# Patient Record
Sex: MALE | Race: WHITE | NOT HISPANIC OR LATINO | Employment: OTHER | URBAN - METROPOLITAN AREA
[De-identification: names, ages, dates, MRNs, and addresses within clinical notes are randomized per-mention and may not be internally consistent; named-entity substitution may affect disease eponyms.]

---

## 2021-05-25 ENCOUNTER — HOSPITAL ENCOUNTER (INPATIENT)
Facility: HOSPITAL | Age: 61
LOS: 7 days | Discharge: HOME HEALTH | End: 2021-06-01
Attending: INTERNAL MEDICINE | Admitting: INTERNAL MEDICINE

## 2021-05-25 ENCOUNTER — APPOINTMENT (OUTPATIENT)
Dept: GENERAL RADIOLOGY | Facility: HOSPITAL | Age: 61
End: 2021-05-25

## 2021-05-25 DIAGNOSIS — M06.9 RHEUMATOID ARTHRITIS OF OTHER SITE, UNSPECIFIED WHETHER RHEUMATOID FACTOR PRESENT (HCC): ICD-10-CM

## 2021-05-25 DIAGNOSIS — L89.220 PRESSURE INJURY OF LEFT THIGH, UNSTAGEABLE (HCC): Primary | ICD-10-CM

## 2021-05-25 PROBLEM — N17.9 ACUTE RENAL FAILURE (ARF) (HCC): Status: ACTIVE | Noted: 2021-05-25

## 2021-05-25 PROBLEM — D50.9 IRON DEFICIENCY ANEMIA: Status: ACTIVE | Noted: 2019-04-09

## 2021-05-25 PROBLEM — M05.00 FELTY'S SYNDROME (HCC): Status: ACTIVE | Noted: 2021-05-25

## 2021-05-25 PROBLEM — I48.91 ATRIAL FIBRILLATION (HCC): Status: ACTIVE | Noted: 2018-11-07

## 2021-05-25 PROBLEM — D69.6 THROMBOCYTOPENIC DISORDER (HCC): Status: ACTIVE | Noted: 2021-05-25

## 2021-05-25 PROBLEM — R60.0 BILATERAL LOWER EXTREMITY EDEMA: Chronic | Status: ACTIVE | Noted: 2021-05-25

## 2021-05-25 PROBLEM — F10.20 ALCOHOLISM (HCC): Status: ACTIVE | Noted: 2021-05-25

## 2021-05-25 PROBLEM — F17.200 NICOTINE DEPENDENCE: Status: ACTIVE | Noted: 2021-05-25

## 2021-05-25 PROBLEM — E53.8 DISORDER OF VITAMIN B12: Status: ACTIVE | Noted: 2019-08-21

## 2021-05-25 PROBLEM — D70.9 NEUTROPENIA (HCC): Status: ACTIVE | Noted: 2021-05-25

## 2021-05-25 PROBLEM — M87.9 BONE NECROSIS (HCC): Status: ACTIVE | Noted: 2019-08-21

## 2021-05-25 PROBLEM — R73.9 HYPERGLYCEMIA: Status: ACTIVE | Noted: 2021-05-25

## 2021-05-25 PROBLEM — N17.9 AKI (ACUTE KIDNEY INJURY) (HCC): Status: ACTIVE | Noted: 2021-05-25

## 2021-05-25 PROBLEM — R60.0 EDEMA OF LOWER EXTREMITY: Status: ACTIVE | Noted: 2021-05-25

## 2021-05-25 PROBLEM — J18.9 PNEUMONIA: Status: ACTIVE | Noted: 2021-05-25

## 2021-05-25 PROBLEM — G62.9 PERIPHERAL NERVE DISEASE: Status: ACTIVE | Noted: 2019-08-21

## 2021-05-25 LAB
ALBUMIN SERPL-MCNC: 2.4 G/DL (ref 3.5–5.2)
ALBUMIN/GLOB SERPL: 0.6 G/DL
ALP SERPL-CCNC: 67 U/L (ref 39–117)
ALT SERPL W P-5'-P-CCNC: 12 U/L (ref 1–41)
AMMONIA BLD-SCNC: <10 UMOL/L (ref 16–60)
ANION GAP SERPL CALCULATED.3IONS-SCNC: 14 MMOL/L (ref 5–15)
AST SERPL-CCNC: 20 U/L (ref 1–40)
B PARAPERT DNA SPEC QL NAA+PROBE: NOT DETECTED
B PERT DNA SPEC QL NAA+PROBE: NOT DETECTED
BILIRUB SERPL-MCNC: 0.8 MG/DL (ref 0–1.2)
BUN SERPL-MCNC: 29 MG/DL (ref 8–23)
BUN/CREAT SERPL: 23 (ref 7–25)
C PNEUM DNA NPH QL NAA+NON-PROBE: NOT DETECTED
CALCIUM SPEC-SCNC: 8.2 MG/DL (ref 8.6–10.5)
CHLORIDE SERPL-SCNC: 105 MMOL/L (ref 98–107)
CO2 SERPL-SCNC: 16 MMOL/L (ref 22–29)
CREAT SERPL-MCNC: 1.26 MG/DL (ref 0.76–1.27)
D-LACTATE SERPL-SCNC: 1.5 MMOL/L (ref 0.5–2)
FLUAV SUBTYP SPEC NAA+PROBE: NOT DETECTED
FLUBV RNA ISLT QL NAA+PROBE: NOT DETECTED
GFR SERPL CREATININE-BSD FRML MDRD: 58 ML/MIN/1.73
GLOBULIN UR ELPH-MCNC: 4.3 GM/DL
GLUCOSE SERPL-MCNC: 81 MG/DL (ref 65–99)
HADV DNA SPEC NAA+PROBE: NOT DETECTED
HCOV 229E RNA SPEC QL NAA+PROBE: NOT DETECTED
HCOV HKU1 RNA SPEC QL NAA+PROBE: NOT DETECTED
HCOV NL63 RNA SPEC QL NAA+PROBE: NOT DETECTED
HCOV OC43 RNA SPEC QL NAA+PROBE: NOT DETECTED
HMPV RNA NPH QL NAA+NON-PROBE: NOT DETECTED
HPIV1 RNA SPEC QL NAA+PROBE: NOT DETECTED
HPIV2 RNA SPEC QL NAA+PROBE: NOT DETECTED
HPIV3 RNA NPH QL NAA+PROBE: NOT DETECTED
HPIV4 P GENE NPH QL NAA+PROBE: NOT DETECTED
L PNEUMO1 AG UR QL IA: NEGATIVE
M PNEUMO IGG SER IA-ACNC: NOT DETECTED
MAGNESIUM SERPL-MCNC: 2 MG/DL (ref 1.6–2.4)
NT-PROBNP SERPL-MCNC: 7361 PG/ML (ref 0–900)
POTASSIUM SERPL-SCNC: 4.1 MMOL/L (ref 3.5–5.2)
PROT SERPL-MCNC: 6.7 G/DL (ref 6–8.5)
RHINOVIRUS RNA SPEC NAA+PROBE: NOT DETECTED
RSV RNA NPH QL NAA+NON-PROBE: NOT DETECTED
S PNEUM AG SPEC QL LA: NEGATIVE
SARS-COV-2 RNA NPH QL NAA+NON-PROBE: NOT DETECTED
SODIUM SERPL-SCNC: 135 MMOL/L (ref 136–145)

## 2021-05-25 PROCEDURE — 71045 X-RAY EXAM CHEST 1 VIEW: CPT

## 2021-05-25 PROCEDURE — 83880 ASSAY OF NATRIURETIC PEPTIDE: CPT | Performed by: NURSE PRACTITIONER

## 2021-05-25 PROCEDURE — 25010000002 CEFTRIAXONE PER 250 MG: Performed by: NURSE PRACTITIONER

## 2021-05-25 PROCEDURE — 80053 COMPREHEN METABOLIC PANEL: CPT | Performed by: NURSE PRACTITIONER

## 2021-05-25 PROCEDURE — 99222 1ST HOSP IP/OBS MODERATE 55: CPT | Performed by: NURSE PRACTITIONER

## 2021-05-25 PROCEDURE — 94799 UNLISTED PULMONARY SVC/PX: CPT

## 2021-05-25 PROCEDURE — 82140 ASSAY OF AMMONIA: CPT | Performed by: NURSE PRACTITIONER

## 2021-05-25 PROCEDURE — 83735 ASSAY OF MAGNESIUM: CPT | Performed by: NURSE PRACTITIONER

## 2021-05-25 PROCEDURE — 87899 AGENT NOS ASSAY W/OPTIC: CPT | Performed by: NURSE PRACTITIONER

## 2021-05-25 PROCEDURE — 94640 AIRWAY INHALATION TREATMENT: CPT

## 2021-05-25 PROCEDURE — 25010000002 METHYLPREDNISOLONE PER 40 MG: Performed by: NURSE PRACTITIONER

## 2021-05-25 PROCEDURE — 93005 ELECTROCARDIOGRAM TRACING: CPT | Performed by: NURSE PRACTITIONER

## 2021-05-25 PROCEDURE — 87040 BLOOD CULTURE FOR BACTERIA: CPT | Performed by: NURSE PRACTITIONER

## 2021-05-25 PROCEDURE — 81001 URINALYSIS AUTO W/SCOPE: CPT | Performed by: NURSE PRACTITIONER

## 2021-05-25 PROCEDURE — 0202U NFCT DS 22 TRGT SARS-COV-2: CPT | Performed by: INTERNAL MEDICINE

## 2021-05-25 PROCEDURE — 93010 ELECTROCARDIOGRAM REPORT: CPT | Performed by: INTERNAL MEDICINE

## 2021-05-25 PROCEDURE — 84300 ASSAY OF URINE SODIUM: CPT | Performed by: NURSE PRACTITIONER

## 2021-05-25 PROCEDURE — 83605 ASSAY OF LACTIC ACID: CPT | Performed by: NURSE PRACTITIONER

## 2021-05-25 RX ORDER — BUMETANIDE 0.25 MG/ML
1 INJECTION INTRAMUSCULAR; INTRAVENOUS ONCE
Status: COMPLETED | OUTPATIENT
Start: 2021-05-25 | End: 2021-05-25

## 2021-05-25 RX ORDER — HYDROXYCHLOROQUINE SULFATE 200 MG/1
200 TABLET, FILM COATED ORAL 2 TIMES DAILY
COMMUNITY
End: 2021-06-01 | Stop reason: HOSPADM

## 2021-05-25 RX ORDER — SODIUM CHLORIDE 0.9 % (FLUSH) 0.9 %
10 SYRINGE (ML) INJECTION AS NEEDED
Status: DISCONTINUED | OUTPATIENT
Start: 2021-05-25 | End: 2021-06-01 | Stop reason: HOSPADM

## 2021-05-25 RX ORDER — ACETAMINOPHEN 325 MG/1
650 TABLET ORAL EVERY 4 HOURS PRN
Status: DISCONTINUED | OUTPATIENT
Start: 2021-05-25 | End: 2021-06-01 | Stop reason: HOSPADM

## 2021-05-25 RX ORDER — HYDROCODONE BITARTRATE AND ACETAMINOPHEN 7.5; 325 MG/1; MG/1
1 TABLET ORAL EVERY 4 HOURS PRN
COMMUNITY

## 2021-05-25 RX ORDER — LORAZEPAM 1 MG/1
2 TABLET ORAL
Status: DISCONTINUED | OUTPATIENT
Start: 2021-05-25 | End: 2021-06-01 | Stop reason: HOSPADM

## 2021-05-25 RX ORDER — METHYLPREDNISOLONE SODIUM SUCCINATE 40 MG/ML
40 INJECTION, POWDER, LYOPHILIZED, FOR SOLUTION INTRAMUSCULAR; INTRAVENOUS EVERY 8 HOURS
Status: DISCONTINUED | OUTPATIENT
Start: 2021-05-25 | End: 2021-05-26

## 2021-05-25 RX ORDER — LORAZEPAM 2 MG/ML
1 INJECTION INTRAMUSCULAR
Status: DISCONTINUED | OUTPATIENT
Start: 2021-05-25 | End: 2021-06-01 | Stop reason: HOSPADM

## 2021-05-25 RX ORDER — FOLIC ACID 1 MG/1
500 TABLET ORAL DAILY
Status: DISCONTINUED | OUTPATIENT
Start: 2021-05-25 | End: 2021-06-01 | Stop reason: HOSPADM

## 2021-05-25 RX ORDER — LORAZEPAM 2 MG/ML
2 INJECTION INTRAMUSCULAR
Status: DISCONTINUED | OUTPATIENT
Start: 2021-05-25 | End: 2021-06-01 | Stop reason: HOSPADM

## 2021-05-25 RX ORDER — HYDROXYCHLOROQUINE SULFATE 200 MG/1
200 TABLET, FILM COATED ORAL 2 TIMES DAILY
Status: DISCONTINUED | OUTPATIENT
Start: 2021-05-25 | End: 2021-05-26

## 2021-05-25 RX ORDER — CHOLECALCIFEROL (VITAMIN D3) 125 MCG
5 CAPSULE ORAL NIGHTLY PRN
Status: DISCONTINUED | OUTPATIENT
Start: 2021-05-25 | End: 2021-06-01 | Stop reason: HOSPADM

## 2021-05-25 RX ORDER — GABAPENTIN 400 MG/1
400 CAPSULE ORAL 3 TIMES DAILY
Status: DISCONTINUED | OUTPATIENT
Start: 2021-05-25 | End: 2021-06-01 | Stop reason: HOSPADM

## 2021-05-25 RX ORDER — PREDNISONE 1 MG/1
5 TABLET ORAL DAILY
Status: DISCONTINUED | OUTPATIENT
Start: 2021-05-26 | End: 2021-05-25

## 2021-05-25 RX ORDER — IPRATROPIUM BROMIDE AND ALBUTEROL SULFATE 2.5; .5 MG/3ML; MG/3ML
3 SOLUTION RESPIRATORY (INHALATION)
Status: DISCONTINUED | OUTPATIENT
Start: 2021-05-25 | End: 2021-06-01 | Stop reason: HOSPADM

## 2021-05-25 RX ORDER — ACETAMINOPHEN 650 MG/1
650 SUPPOSITORY RECTAL EVERY 4 HOURS PRN
Status: DISCONTINUED | OUTPATIENT
Start: 2021-05-25 | End: 2021-06-01 | Stop reason: HOSPADM

## 2021-05-25 RX ORDER — ONDANSETRON 4 MG/1
4 TABLET, FILM COATED ORAL EVERY 6 HOURS PRN
Status: DISCONTINUED | OUTPATIENT
Start: 2021-05-25 | End: 2021-06-01 | Stop reason: HOSPADM

## 2021-05-25 RX ORDER — HYDROCODONE BITARTRATE AND ACETAMINOPHEN 7.5; 325 MG/1; MG/1
1 TABLET ORAL EVERY 8 HOURS PRN
Status: DISCONTINUED | OUTPATIENT
Start: 2021-05-25 | End: 2021-06-01 | Stop reason: HOSPADM

## 2021-05-25 RX ORDER — PREDNISONE 1 MG/1
5 TABLET ORAL DAILY
COMMUNITY

## 2021-05-25 RX ORDER — LORAZEPAM 1 MG/1
1 TABLET ORAL
Status: DISCONTINUED | OUTPATIENT
Start: 2021-05-25 | End: 2021-06-01 | Stop reason: HOSPADM

## 2021-05-25 RX ORDER — ACETAMINOPHEN 160 MG/5ML
650 SOLUTION ORAL EVERY 4 HOURS PRN
Status: DISCONTINUED | OUTPATIENT
Start: 2021-05-25 | End: 2021-06-01 | Stop reason: HOSPADM

## 2021-05-25 RX ORDER — SODIUM CHLORIDE 0.9 % (FLUSH) 0.9 %
10 SYRINGE (ML) INJECTION EVERY 12 HOURS SCHEDULED
Status: DISCONTINUED | OUTPATIENT
Start: 2021-05-25 | End: 2021-06-01 | Stop reason: HOSPADM

## 2021-05-25 RX ORDER — GABAPENTIN 400 MG/1
400 CAPSULE ORAL 3 TIMES DAILY
COMMUNITY

## 2021-05-25 RX ORDER — ALUMINA, MAGNESIA, AND SIMETHICONE 2400; 2400; 240 MG/30ML; MG/30ML; MG/30ML
15 SUSPENSION ORAL EVERY 6 HOURS PRN
Status: DISCONTINUED | OUTPATIENT
Start: 2021-05-25 | End: 2021-05-25

## 2021-05-25 RX ORDER — ONDANSETRON 2 MG/ML
4 INJECTION INTRAMUSCULAR; INTRAVENOUS EVERY 6 HOURS PRN
Status: DISCONTINUED | OUTPATIENT
Start: 2021-05-25 | End: 2021-06-01 | Stop reason: HOSPADM

## 2021-05-25 RX ADMIN — CEFTRIAXONE SODIUM 1 G: 1 INJECTION, POWDER, FOR SOLUTION INTRAMUSCULAR; INTRAVENOUS at 21:01

## 2021-05-25 RX ADMIN — HYDROXYCHLOROQUINE SULFATE 200 MG: 200 TABLET ORAL at 21:02

## 2021-05-25 RX ADMIN — DOXYCYCLINE 100 MG: 100 INJECTION, POWDER, LYOPHILIZED, FOR SOLUTION INTRAVENOUS at 21:48

## 2021-05-25 RX ADMIN — METHYLPREDNISOLONE SODIUM SUCCINATE 40 MG: 40 INJECTION, POWDER, FOR SOLUTION INTRAMUSCULAR; INTRAVENOUS at 21:48

## 2021-05-25 RX ADMIN — GABAPENTIN 400 MG: 400 CAPSULE ORAL at 21:02

## 2021-05-25 RX ADMIN — FOLIC ACID 500 MCG: 1 TABLET ORAL at 21:51

## 2021-05-25 RX ADMIN — Medication 100 MG: at 21:48

## 2021-05-25 RX ADMIN — IPRATROPIUM BROMIDE AND ALBUTEROL SULFATE 3 ML: 2.5; .5 SOLUTION RESPIRATORY (INHALATION) at 19:47

## 2021-05-25 RX ADMIN — BUMETANIDE 1 MG: 0.25 INJECTION INTRAMUSCULAR; INTRAVENOUS at 22:06

## 2021-05-25 RX ADMIN — Medication 10 ML: at 21:02

## 2021-05-26 ENCOUNTER — APPOINTMENT (OUTPATIENT)
Dept: CARDIOLOGY | Facility: HOSPITAL | Age: 61
End: 2021-05-26

## 2021-05-26 ENCOUNTER — APPOINTMENT (OUTPATIENT)
Dept: CT IMAGING | Facility: HOSPITAL | Age: 61
End: 2021-05-26

## 2021-05-26 PROBLEM — L89.220 PRESSURE INJURY OF LEFT THIGH, UNSTAGEABLE (HCC): Status: ACTIVE | Noted: 2021-05-26

## 2021-05-26 LAB
ANION GAP SERPL CALCULATED.3IONS-SCNC: 12 MMOL/L (ref 5–15)
ANISOCYTOSIS BLD QL: ABNORMAL
APTT PPP: 29.5 SECONDS (ref 24–31)
BACTERIA UR QL AUTO: ABNORMAL /HPF
BASOPHILS # BLD MANUAL: 0.01 10*3/MM3 (ref 0–0.2)
BASOPHILS NFR BLD AUTO: 2 % (ref 0–1.5)
BH CV ECHO MEAS - ACS: 1.9 CM
BH CV ECHO MEAS - AO MAX PG (FULL): 1.2 MMHG
BH CV ECHO MEAS - AO MAX PG: 4.9 MMHG
BH CV ECHO MEAS - AO MEAN PG (FULL): 0.91 MMHG
BH CV ECHO MEAS - AO MEAN PG: 2.9 MMHG
BH CV ECHO MEAS - AO V2 MAX: 110.2 CM/SEC
BH CV ECHO MEAS - AO V2 MEAN: 81.2 CM/SEC
BH CV ECHO MEAS - AO V2 VTI: 18.2 CM
BH CV ECHO MEAS - AORTIC HR: 91.7 BPM
BH CV ECHO MEAS - AORTIC R-R: 0.65 SEC
BH CV ECHO MEAS - AVA(I,A): 5.2 CM^2
BH CV ECHO MEAS - AVA(I,D): 5.2 CM^2
BH CV ECHO MEAS - AVA(V,A): 4.3 CM^2
BH CV ECHO MEAS - AVA(V,D): 4.3 CM^2
BH CV ECHO MEAS - BSA(HAYCOCK): 2.3 M^2
BH CV ECHO MEAS - BSA: 2.2 M^2
BH CV ECHO MEAS - BZI_BMI: 33.9 KILOGRAMS/M^2
BH CV ECHO MEAS - BZI_METRIC_HEIGHT: 177.8 CM
BH CV ECHO MEAS - BZI_METRIC_WEIGHT: 107 KG
BH CV ECHO MEAS - CI(LVOT): 3.8 L/MIN/M^2
BH CV ECHO MEAS - CO(LVOT): 8.6 L/MIN
BH CV ECHO MEAS - EDV(CUBED): 77.8 ML
BH CV ECHO MEAS - EDV(MOD-SP4): 46.6 ML
BH CV ECHO MEAS - EDV(TEICH): 81.7 ML
BH CV ECHO MEAS - EF(CUBED): 59.7 %
BH CV ECHO MEAS - EF(MOD-BP): 60 %
BH CV ECHO MEAS - EF(MOD-SP4): 69.5 %
BH CV ECHO MEAS - EF(TEICH): 51.6 %
BH CV ECHO MEAS - ESV(CUBED): 31.3 ML
BH CV ECHO MEAS - ESV(MOD-SP4): 14.2 ML
BH CV ECHO MEAS - ESV(TEICH): 39.5 ML
BH CV ECHO MEAS - FS: 26.2 %
BH CV ECHO MEAS - IVS/LVPW: 1
BH CV ECHO MEAS - IVSD: 1.3 CM
BH CV ECHO MEAS - LA DIMENSION(2D): 4 CM
BH CV ECHO MEAS - LA DIMENSION: 4 CM
BH CV ECHO MEAS - LV DIASTOLIC VOL/BSA (35-75): 20.8 ML/M^2
BH CV ECHO MEAS - LV MASS(C)D: 207 GRAMS
BH CV ECHO MEAS - LV MASS(C)DI: 92.5 GRAMS/M^2
BH CV ECHO MEAS - LV MAX PG: 3.6 MMHG
BH CV ECHO MEAS - LV MEAN PG: 2 MMHG
BH CV ECHO MEAS - LV SYSTOLIC VOL/BSA (12-30): 6.3 ML/M^2
BH CV ECHO MEAS - LV V1 MAX: 95 CM/SEC
BH CV ECHO MEAS - LV V1 MEAN: 63.1 CM/SEC
BH CV ECHO MEAS - LV V1 VTI: 18.7 CM
BH CV ECHO MEAS - LVIDD: 4.3 CM
BH CV ECHO MEAS - LVIDS: 3.2 CM
BH CV ECHO MEAS - LVOT AREA: 5 CM^2
BH CV ECHO MEAS - LVOT DIAM: 2.5 CM
BH CV ECHO MEAS - LVPWD: 1.3 CM
BH CV ECHO MEAS - MV MAX PG: 3.3 MMHG
BH CV ECHO MEAS - MV MEAN PG: 1.1 MMHG
BH CV ECHO MEAS - MV V2 MAX: 90.6 CM/SEC
BH CV ECHO MEAS - MV V2 MEAN: 46.8 CM/SEC
BH CV ECHO MEAS - MV V2 VTI: 19.1 CM
BH CV ECHO MEAS - MVA(VTI): 4.9 CM^2
BH CV ECHO MEAS - PA ACC TIME: 0.13 SEC
BH CV ECHO MEAS - PA MAX PG (FULL): 1.8 MMHG
BH CV ECHO MEAS - PA MAX PG: 3.8 MMHG
BH CV ECHO MEAS - PA MEAN PG (FULL): 1.5 MMHG
BH CV ECHO MEAS - PA MEAN PG: 2.5 MMHG
BH CV ECHO MEAS - PA PR(ACCEL): 20.8 MMHG
BH CV ECHO MEAS - PA V2 MAX: 97 CM/SEC
BH CV ECHO MEAS - PA V2 MEAN: 77.2 CM/SEC
BH CV ECHO MEAS - PA V2 VTI: 17 CM
BH CV ECHO MEAS - RAP SYSTOLE: 3 MMHG
BH CV ECHO MEAS - RV MAX PG: 2 MMHG
BH CV ECHO MEAS - RV MEAN PG: 1 MMHG
BH CV ECHO MEAS - RV V1 MAX: 70.9 CM/SEC
BH CV ECHO MEAS - RV V1 MEAN: 47.6 CM/SEC
BH CV ECHO MEAS - RV V1 VTI: 12 CM
BH CV ECHO MEAS - RVDD: 3.5 CM
BH CV ECHO MEAS - RVSP: 20.8 MMHG
BH CV ECHO MEAS - SI(CUBED): 20.7 ML/M^2
BH CV ECHO MEAS - SI(LVOT): 42 ML/M^2
BH CV ECHO MEAS - SI(MOD-SP4): 14.5 ML/M^2
BH CV ECHO MEAS - SI(TEICH): 18.8 ML/M^2
BH CV ECHO MEAS - SV(CUBED): 46.5 ML
BH CV ECHO MEAS - SV(LVOT): 94 ML
BH CV ECHO MEAS - SV(MOD-SP4): 32.4 ML
BH CV ECHO MEAS - SV(TEICH): 42.2 ML
BH CV ECHO MEAS - TR MAX VEL: 210.5 CM/SEC
BILIRUB UR QL STRIP: NEGATIVE
BUN SERPL-MCNC: 31 MG/DL (ref 8–23)
BUN/CREAT SERPL: 25.2 (ref 7–25)
CALCIUM SPEC-SCNC: 8.5 MG/DL (ref 8.6–10.5)
CHLORIDE SERPL-SCNC: 102 MMOL/L (ref 98–107)
CLARITY UR: CLEAR
CO2 SERPL-SCNC: 21 MMOL/L (ref 22–29)
COLOR UR: ABNORMAL
CREAT SERPL-MCNC: 1.23 MG/DL (ref 0.76–1.27)
DEPRECATED RDW RBC AUTO: 54.3 FL (ref 37–54)
ERYTHROCYTE [DISTWIDTH] IN BLOOD BY AUTOMATED COUNT: 15.9 % (ref 12.3–15.4)
FERRITIN SERPL-MCNC: 2555 NG/ML (ref 30–400)
FOLATE SERPL-MCNC: 10.5 NG/ML (ref 4.78–24.2)
GFR SERPL CREATININE-BSD FRML MDRD: 59 ML/MIN/1.73
GLUCOSE SERPL-MCNC: 134 MG/DL (ref 65–99)
GLUCOSE UR STRIP-MCNC: NEGATIVE MG/DL
HAPTOGLOB SERPL-MCNC: 231 MG/DL (ref 30–200)
HCT VFR BLD AUTO: 30.4 % (ref 37.5–51)
HEMOCCULT STL QL IA: NEGATIVE
HGB BLD-MCNC: 10.4 G/DL (ref 13–17.7)
HGB UR QL STRIP.AUTO: NEGATIVE
HYALINE CASTS UR QL AUTO: ABNORMAL /LPF
INR PPP: 1.06 (ref 0.93–1.1)
IRON 24H UR-MRATE: 50 MCG/DL (ref 59–158)
IRON SATN MFR SERPL: 28 % (ref 20–50)
KETONES UR QL STRIP: ABNORMAL
LARGE PLATELETS: ABNORMAL
LEUKOCYTE ESTERASE UR QL STRIP.AUTO: NEGATIVE
LV EF 2D ECHO EST: 60 %
LYMPHOCYTES # BLD MANUAL: 0.43 10*3/MM3 (ref 0.7–3.1)
LYMPHOCYTES NFR BLD MANUAL: 2 % (ref 5–12)
LYMPHOCYTES NFR BLD MANUAL: 70 % (ref 19.6–45.3)
MACROCYTES BLD QL SMEAR: ABNORMAL
MCH RBC QN AUTO: 33.4 PG (ref 26.6–33)
MCHC RBC AUTO-ENTMCNC: 34.3 G/DL (ref 31.5–35.7)
MCV RBC AUTO: 97.5 FL (ref 79–97)
METAMYELOCYTES NFR BLD MANUAL: 3 % (ref 0–0)
MONOCYTES # BLD AUTO: 0.01 10*3/MM3 (ref 0.1–0.9)
MYELOCYTES NFR BLD MANUAL: 1 % (ref 0–0)
NEUTROPHILS # BLD AUTO: 0.13 10*3/MM3 (ref 1.7–7)
NEUTROPHILS NFR BLD MANUAL: 19 % (ref 42.7–76)
NEUTS BAND NFR BLD MANUAL: 2 % (ref 0–5)
NITRITE UR QL STRIP: NEGATIVE
NT-PROBNP SERPL-MCNC: 7774 PG/ML (ref 0–900)
PH UR STRIP.AUTO: 6 [PH] (ref 5–8)
PLATELET # BLD AUTO: 63 10*3/MM3 (ref 140–450)
PMV BLD AUTO: 9.2 FL (ref 6–12)
POTASSIUM SERPL-SCNC: 4.1 MMOL/L (ref 3.5–5.2)
PROT UR QL STRIP: ABNORMAL
PROTHROMBIN TIME: 11.6 SECONDS (ref 9.6–11.7)
RBC # BLD AUTO: 3.12 10*6/MM3 (ref 4.14–5.8)
RBC # UR: ABNORMAL /HPF
REF LAB TEST METHOD: ABNORMAL
RETICS # AUTO: 0.04 10*6/MM3 (ref 0.02–0.13)
RETICS/RBC NFR AUTO: 1.11 % (ref 0.7–1.9)
SCAN SLIDE: NORMAL
SMALL PLATELETS BLD QL SMEAR: ABNORMAL
SODIUM SERPL-SCNC: 135 MMOL/L (ref 136–145)
SODIUM UR-SCNC: 65 MMOL/L
SP GR UR STRIP: 1.02 (ref 1–1.03)
SQUAMOUS #/AREA URNS HPF: ABNORMAL /HPF
TIBC SERPL-MCNC: 176 MCG/DL (ref 298–536)
TRANSFERRIN SERPL-MCNC: 118 MG/DL (ref 200–360)
TROPONIN T SERPL-MCNC: <0.01 NG/ML (ref 0–0.03)
UROBILINOGEN UR QL STRIP: ABNORMAL
VARIANT LYMPHS NFR BLD MANUAL: 1 % (ref 0–5)
VIT B12 BLD-MCNC: 1910 PG/ML (ref 211–946)
WBC # BLD AUTO: 0.6 10*3/MM3 (ref 3.4–10.8)
WBC MORPH BLD: NORMAL
WBC UR QL AUTO: ABNORMAL /HPF

## 2021-05-26 PROCEDURE — 81342 TRG GENE REARRANGEMENT ANAL: CPT | Performed by: NURSE PRACTITIONER

## 2021-05-26 PROCEDURE — 85025 COMPLETE CBC W/AUTO DIFF WBC: CPT | Performed by: INTERNAL MEDICINE

## 2021-05-26 PROCEDURE — 83880 ASSAY OF NATRIURETIC PEPTIDE: CPT | Performed by: INTERNAL MEDICINE

## 2021-05-26 PROCEDURE — 88237 TISSUE CULTURE BONE MARROW: CPT

## 2021-05-26 PROCEDURE — 82274 ASSAY TEST FOR BLOOD FECAL: CPT | Performed by: INTERNAL MEDICINE

## 2021-05-26 PROCEDURE — 83010 ASSAY OF HAPTOGLOBIN QUANT: CPT | Performed by: NURSE PRACTITIONER

## 2021-05-26 PROCEDURE — 82728 ASSAY OF FERRITIN: CPT | Performed by: NURSE PRACTITIONER

## 2021-05-26 PROCEDURE — 93010 ELECTROCARDIOGRAM REPORT: CPT | Performed by: INTERNAL MEDICINE

## 2021-05-26 PROCEDURE — 82525 ASSAY OF COPPER: CPT | Performed by: NURSE PRACTITIONER

## 2021-05-26 PROCEDURE — 82746 ASSAY OF FOLIC ACID SERUM: CPT | Performed by: NURSE PRACTITIONER

## 2021-05-26 PROCEDURE — 94799 UNLISTED PULMONARY SVC/PX: CPT

## 2021-05-26 PROCEDURE — 99233 SBSQ HOSP IP/OBS HIGH 50: CPT | Performed by: INTERNAL MEDICINE

## 2021-05-26 PROCEDURE — 83540 ASSAY OF IRON: CPT | Performed by: NURSE PRACTITIONER

## 2021-05-26 PROCEDURE — 84155 ASSAY OF PROTEIN SERUM: CPT | Performed by: NURSE PRACTITIONER

## 2021-05-26 PROCEDURE — 85045 AUTOMATED RETICULOCYTE COUNT: CPT | Performed by: NURSE PRACTITIONER

## 2021-05-26 PROCEDURE — 25010000002 CEFEPIME PER 500 MG: Performed by: INTERNAL MEDICINE

## 2021-05-26 PROCEDURE — 99221 1ST HOSP IP/OBS SF/LOW 40: CPT | Performed by: INTERNAL MEDICINE

## 2021-05-26 PROCEDURE — 71250 CT THORAX DX C-: CPT

## 2021-05-26 PROCEDURE — 93306 TTE W/DOPPLER COMPLETE: CPT

## 2021-05-26 PROCEDURE — 86645 CMV ANTIBODY IGM: CPT | Performed by: NURSE PRACTITIONER

## 2021-05-26 PROCEDURE — 63710000001 PREDNISONE PER 5 MG: Performed by: INTERNAL MEDICINE

## 2021-05-26 PROCEDURE — 81340 TRB@ GENE REARRANGE AMPLIFY: CPT

## 2021-05-26 PROCEDURE — 97165 OT EVAL LOW COMPLEX 30 MIN: CPT

## 2021-05-26 PROCEDURE — 82607 VITAMIN B-12: CPT | Performed by: NURSE PRACTITIONER

## 2021-05-26 PROCEDURE — 88185 FLOWCYTOMETRY/TC ADD-ON: CPT

## 2021-05-26 PROCEDURE — 85610 PROTHROMBIN TIME: CPT | Performed by: NURSE PRACTITIONER

## 2021-05-26 PROCEDURE — 84484 ASSAY OF TROPONIN QUANT: CPT | Performed by: INTERNAL MEDICINE

## 2021-05-26 PROCEDURE — 85730 THROMBOPLASTIN TIME PARTIAL: CPT | Performed by: NURSE PRACTITIONER

## 2021-05-26 PROCEDURE — 88184 FLOWCYTOMETRY/ TC 1 MARKER: CPT

## 2021-05-26 PROCEDURE — 97530 THERAPEUTIC ACTIVITIES: CPT

## 2021-05-26 PROCEDURE — 93005 ELECTROCARDIOGRAM TRACING: CPT | Performed by: INTERNAL MEDICINE

## 2021-05-26 PROCEDURE — 84466 ASSAY OF TRANSFERRIN: CPT | Performed by: NURSE PRACTITIONER

## 2021-05-26 PROCEDURE — 84165 PROTEIN E-PHORESIS SERUM: CPT | Performed by: NURSE PRACTITIONER

## 2021-05-26 PROCEDURE — 99231 SBSQ HOSP IP/OBS SF/LOW 25: CPT | Performed by: NURSE PRACTITIONER

## 2021-05-26 PROCEDURE — 86665 EPSTEIN-BARR CAPSID VCA: CPT | Performed by: NURSE PRACTITIONER

## 2021-05-26 PROCEDURE — 80048 BASIC METABOLIC PNL TOTAL CA: CPT | Performed by: INTERNAL MEDICINE

## 2021-05-26 PROCEDURE — 85007 BL SMEAR W/DIFF WBC COUNT: CPT | Performed by: INTERNAL MEDICINE

## 2021-05-26 PROCEDURE — 93306 TTE W/DOPPLER COMPLETE: CPT | Performed by: INTERNAL MEDICINE

## 2021-05-26 PROCEDURE — 25010000002 METHYLPREDNISOLONE PER 40 MG: Performed by: NURSE PRACTITIONER

## 2021-05-26 PROCEDURE — 25010000002 FUROSEMIDE PER 20 MG: Performed by: INTERNAL MEDICINE

## 2021-05-26 RX ORDER — FUROSEMIDE 10 MG/ML
40 INJECTION INTRAMUSCULAR; INTRAVENOUS
Status: DISCONTINUED | OUTPATIENT
Start: 2021-05-26 | End: 2021-05-27

## 2021-05-26 RX ORDER — PREDNISONE 1 MG/1
5 TABLET ORAL DAILY
Status: DISCONTINUED | OUTPATIENT
Start: 2021-05-26 | End: 2021-06-01 | Stop reason: HOSPADM

## 2021-05-26 RX ORDER — FUROSEMIDE 10 MG/ML
20 INJECTION INTRAMUSCULAR; INTRAVENOUS EVERY 12 HOURS
Status: DISCONTINUED | OUTPATIENT
Start: 2021-05-26 | End: 2021-05-26

## 2021-05-26 RX ADMIN — DOXYCYCLINE 100 MG: 100 INJECTION, POWDER, LYOPHILIZED, FOR SOLUTION INTRAVENOUS at 09:21

## 2021-05-26 RX ADMIN — FOLIC ACID 500 MCG: 1 TABLET ORAL at 09:21

## 2021-05-26 RX ADMIN — FUROSEMIDE 40 MG: 10 INJECTION, SOLUTION INTRAMUSCULAR; INTRAVENOUS at 17:52

## 2021-05-26 RX ADMIN — METHYLPREDNISOLONE SODIUM SUCCINATE 40 MG: 40 INJECTION, POWDER, FOR SOLUTION INTRAMUSCULAR; INTRAVENOUS at 06:05

## 2021-05-26 RX ADMIN — HYDROXYCHLOROQUINE SULFATE 200 MG: 200 TABLET ORAL at 09:21

## 2021-05-26 RX ADMIN — DOXYCYCLINE 100 MG: 100 INJECTION, POWDER, LYOPHILIZED, FOR SOLUTION INTRAVENOUS at 20:47

## 2021-05-26 RX ADMIN — Medication 10 ML: at 10:07

## 2021-05-26 RX ADMIN — CEFEPIME 2 G: 2 INJECTION, POWDER, FOR SOLUTION INTRAVENOUS at 19:52

## 2021-05-26 RX ADMIN — HYDROCODONE BITARTRATE AND ACETAMINOPHEN 1 TABLET: 7.5; 325 TABLET ORAL at 17:58

## 2021-05-26 RX ADMIN — GABAPENTIN 400 MG: 400 CAPSULE ORAL at 20:00

## 2021-05-26 RX ADMIN — Medication 10 ML: at 20:00

## 2021-05-26 RX ADMIN — PREDNISONE 5 MG: 5 TABLET ORAL at 09:21

## 2021-05-26 RX ADMIN — IPRATROPIUM BROMIDE AND ALBUTEROL SULFATE 3 ML: 2.5; .5 SOLUTION RESPIRATORY (INHALATION) at 11:44

## 2021-05-26 RX ADMIN — GABAPENTIN 400 MG: 400 CAPSULE ORAL at 17:52

## 2021-05-26 RX ADMIN — GABAPENTIN 400 MG: 400 CAPSULE ORAL at 09:22

## 2021-05-26 RX ADMIN — FUROSEMIDE 40 MG: 10 INJECTION, SOLUTION INTRAMUSCULAR; INTRAVENOUS at 13:04

## 2021-05-26 RX ADMIN — Medication 100 MG: at 09:21

## 2021-05-27 ENCOUNTER — APPOINTMENT (OUTPATIENT)
Dept: ULTRASOUND IMAGING | Facility: HOSPITAL | Age: 61
End: 2021-05-27

## 2021-05-27 LAB
ALBUMIN SERPL ELPH-MCNC: 2.2 G/DL (ref 2.9–4.4)
ALBUMIN SERPL-MCNC: 2.5 G/DL (ref 3.5–5.2)
ALBUMIN/GLOB SERPL: 0.6 G/DL
ALBUMIN/GLOB SERPL: 0.6 {RATIO} (ref 0.7–1.7)
ALP SERPL-CCNC: 121 U/L (ref 39–117)
ALPHA1 GLOB SERPL ELPH-MCNC: 0.5 G/DL (ref 0–0.4)
ALPHA2 GLOB SERPL ELPH-MCNC: 1 G/DL (ref 0.4–1)
ALT SERPL W P-5'-P-CCNC: 26 U/L (ref 1–41)
ANION GAP SERPL CALCULATED.3IONS-SCNC: 14 MMOL/L (ref 5–15)
AST SERPL-CCNC: 44 U/L (ref 1–40)
B-GLOBULIN SERPL ELPH-MCNC: 1.1 G/DL (ref 0.7–1.3)
BASOPHILS # BLD AUTO: 0 10*3/MM3 (ref 0–0.2)
BASOPHILS NFR BLD AUTO: 0.8 % (ref 0–1.5)
BILIRUB SERPL-MCNC: 0.5 MG/DL (ref 0–1.2)
BUN SERPL-MCNC: 44 MG/DL (ref 8–23)
BUN/CREAT SERPL: 24.9 (ref 7–25)
CALCIUM SPEC-SCNC: 8.5 MG/DL (ref 8.6–10.5)
CHLORIDE SERPL-SCNC: 108 MMOL/L (ref 98–107)
CMV IGM SERPL IA-ACNC: <30 AU/ML (ref 0–29.9)
CO2 SERPL-SCNC: 17 MMOL/L (ref 22–29)
CREAT SERPL-MCNC: 1.77 MG/DL (ref 0.76–1.27)
DEPRECATED RDW RBC AUTO: 52.5 FL (ref 37–54)
EBV VCA IGM SER IA-ACNC: <36 U/ML (ref 0–35.9)
EOSINOPHIL # BLD AUTO: 0 10*3/MM3 (ref 0–0.4)
EOSINOPHIL NFR BLD AUTO: 1 % (ref 0.3–6.2)
ERYTHROCYTE [DISTWIDTH] IN BLOOD BY AUTOMATED COUNT: 15.5 % (ref 12.3–15.4)
GAMMA GLOB SERPL ELPH-MCNC: 1.4 G/DL (ref 0.4–1.8)
GFR SERPL CREATININE-BSD FRML MDRD: 39 ML/MIN/1.73
GLOBULIN SER CALC-MCNC: 4 G/DL (ref 2.2–3.9)
GLOBULIN UR ELPH-MCNC: 4 GM/DL
GLUCOSE SERPL-MCNC: 146 MG/DL (ref 65–99)
HCT VFR BLD AUTO: 29.3 % (ref 37.5–51)
HGB BLD-MCNC: 10.2 G/DL (ref 13–17.7)
LABORATORY COMMENT REPORT: ABNORMAL
LYMPHOCYTES # BLD AUTO: 0.7 10*3/MM3 (ref 0.7–3.1)
LYMPHOCYTES NFR BLD AUTO: 51.3 % (ref 19.6–45.3)
M PROTEIN SERPL ELPH-MCNC: 0.4 G/DL
MCH RBC QN AUTO: 34.1 PG (ref 26.6–33)
MCHC RBC AUTO-ENTMCNC: 34.8 G/DL (ref 31.5–35.7)
MCV RBC AUTO: 97.9 FL (ref 79–97)
MONOCYTES # BLD AUTO: 0.1 10*3/MM3 (ref 0.1–0.9)
MONOCYTES NFR BLD AUTO: 5.3 % (ref 5–12)
NEUTROPHILS NFR BLD AUTO: 0.6 10*3/MM3 (ref 1.7–7)
NEUTROPHILS NFR BLD AUTO: 41.6 % (ref 42.7–76)
NRBC BLD AUTO-RTO: 1 /100 WBC (ref 0–0.2)
PLATELET # BLD AUTO: 63 10*3/MM3 (ref 140–450)
PMV BLD AUTO: 10 FL (ref 6–12)
POTASSIUM SERPL-SCNC: 3.9 MMOL/L (ref 3.5–5.2)
PROT PATTERN SERPL ELPH-IMP: ABNORMAL
PROT SERPL-MCNC: 6.2 G/DL (ref 6–8.5)
PROT SERPL-MCNC: 6.5 G/DL (ref 6–8.5)
QT INTERVAL: 417 MS
RBC # BLD AUTO: 2.99 10*6/MM3 (ref 4.14–5.8)
SODIUM SERPL-SCNC: 139 MMOL/L (ref 136–145)
WBC # BLD AUTO: 1.3 10*3/MM3 (ref 3.4–10.8)

## 2021-05-27 PROCEDURE — 94799 UNLISTED PULMONARY SVC/PX: CPT

## 2021-05-27 PROCEDURE — 25010000002 FUROSEMIDE PER 20 MG: Performed by: INTERNAL MEDICINE

## 2021-05-27 PROCEDURE — 97162 PT EVAL MOD COMPLEX 30 MIN: CPT

## 2021-05-27 PROCEDURE — 80053 COMPREHEN METABOLIC PANEL: CPT | Performed by: INTERNAL MEDICINE

## 2021-05-27 PROCEDURE — 99232 SBSQ HOSP IP/OBS MODERATE 35: CPT | Performed by: INTERNAL MEDICINE

## 2021-05-27 PROCEDURE — 85025 COMPLETE CBC W/AUTO DIFF WBC: CPT | Performed by: INTERNAL MEDICINE

## 2021-05-27 PROCEDURE — 25010000002 CEFEPIME PER 500 MG: Performed by: INTERNAL MEDICINE

## 2021-05-27 PROCEDURE — 63710000001 PREDNISONE PER 5 MG: Performed by: INTERNAL MEDICINE

## 2021-05-27 PROCEDURE — 99233 SBSQ HOSP IP/OBS HIGH 50: CPT | Performed by: INTERNAL MEDICINE

## 2021-05-27 PROCEDURE — 97116 GAIT TRAINING THERAPY: CPT

## 2021-05-27 PROCEDURE — 76705 ECHO EXAM OF ABDOMEN: CPT

## 2021-05-27 RX ORDER — SODIUM CHLORIDE 9 MG/ML
50 INJECTION, SOLUTION INTRAVENOUS CONTINUOUS
Status: DISCONTINUED | OUTPATIENT
Start: 2021-05-27 | End: 2021-06-01 | Stop reason: HOSPADM

## 2021-05-27 RX ORDER — DOXYCYCLINE 100 MG/1
100 TABLET ORAL EVERY 12 HOURS SCHEDULED
Status: DISCONTINUED | OUTPATIENT
Start: 2021-05-27 | End: 2021-05-27

## 2021-05-27 RX ORDER — ARGININE/GLUTAMINE/CALCIUM BMB 7G-7G-1.5G
1 POWDER IN PACKET (EA) ORAL 2 TIMES DAILY
Status: DISCONTINUED | OUTPATIENT
Start: 2021-05-27 | End: 2021-06-01 | Stop reason: HOSPADM

## 2021-05-27 RX ADMIN — HYDROCODONE BITARTRATE AND ACETAMINOPHEN 1 TABLET: 7.5; 325 TABLET ORAL at 08:11

## 2021-05-27 RX ADMIN — Medication 100 MG: at 08:07

## 2021-05-27 RX ADMIN — CEFEPIME 2 G: 2 INJECTION, POWDER, FOR SOLUTION INTRAVENOUS at 03:46

## 2021-05-27 RX ADMIN — DOXYCYCLINE 100 MG: 100 INJECTION, POWDER, LYOPHILIZED, FOR SOLUTION INTRAVENOUS at 08:08

## 2021-05-27 RX ADMIN — IPRATROPIUM BROMIDE AND ALBUTEROL SULFATE 3 ML: 2.5; .5 SOLUTION RESPIRATORY (INHALATION) at 19:07

## 2021-05-27 RX ADMIN — Medication 10 ML: at 08:09

## 2021-05-27 RX ADMIN — CEFEPIME 2 G: 2 INJECTION, POWDER, FOR SOLUTION INTRAVENOUS at 12:48

## 2021-05-27 RX ADMIN — SODIUM CHLORIDE 50 ML/HR: 900 INJECTION INTRAVENOUS at 12:49

## 2021-05-27 RX ADMIN — FUROSEMIDE 40 MG: 10 INJECTION, SOLUTION INTRAMUSCULAR; INTRAVENOUS at 08:08

## 2021-05-27 RX ADMIN — HYDROCODONE BITARTRATE AND ACETAMINOPHEN 1 TABLET: 7.5; 325 TABLET ORAL at 20:52

## 2021-05-27 RX ADMIN — GABAPENTIN 400 MG: 400 CAPSULE ORAL at 16:57

## 2021-05-27 RX ADMIN — GABAPENTIN 400 MG: 400 CAPSULE ORAL at 08:07

## 2021-05-27 RX ADMIN — IPRATROPIUM BROMIDE AND ALBUTEROL SULFATE 3 ML: 2.5; .5 SOLUTION RESPIRATORY (INHALATION) at 11:30

## 2021-05-27 RX ADMIN — FOLIC ACID 500 MCG: 1 TABLET ORAL at 08:08

## 2021-05-27 RX ADMIN — Medication 1 PACKET: at 21:49

## 2021-05-27 RX ADMIN — PREDNISONE 5 MG: 5 TABLET ORAL at 08:07

## 2021-05-27 RX ADMIN — Medication 10 ML: at 20:47

## 2021-05-27 RX ADMIN — GABAPENTIN 400 MG: 400 CAPSULE ORAL at 20:47

## 2021-05-28 ENCOUNTER — APPOINTMENT (OUTPATIENT)
Dept: GENERAL RADIOLOGY | Facility: HOSPITAL | Age: 61
End: 2021-05-28

## 2021-05-28 LAB
ALBUMIN SERPL-MCNC: 2.6 G/DL (ref 3.5–5.2)
ANION GAP SERPL CALCULATED.3IONS-SCNC: 10 MMOL/L (ref 5–15)
ANISOCYTOSIS BLD QL: ABNORMAL
BUN SERPL-MCNC: 51 MG/DL (ref 8–23)
BUN/CREAT SERPL: 38.3 (ref 7–25)
CALCIUM SPEC-SCNC: 8.6 MG/DL (ref 8.6–10.5)
CHLORIDE SERPL-SCNC: 105 MMOL/L (ref 98–107)
CO2 SERPL-SCNC: 23 MMOL/L (ref 22–29)
CREAT SERPL-MCNC: 1.33 MG/DL (ref 0.76–1.27)
D-LACTATE SERPL-SCNC: 1.8 MMOL/L (ref 0.5–2)
DEPRECATED RDW RBC AUTO: 52.9 FL (ref 37–54)
ERYTHROCYTE [DISTWIDTH] IN BLOOD BY AUTOMATED COUNT: 15.6 % (ref 12.3–15.4)
GFR SERPL CREATININE-BSD FRML MDRD: 54 ML/MIN/1.73
GLUCOSE SERPL-MCNC: 118 MG/DL (ref 65–99)
HCT VFR BLD AUTO: 29 % (ref 37.5–51)
HGB BLD-MCNC: 10 G/DL (ref 13–17.7)
LARGE PLATELETS: ABNORMAL
LYMPHOCYTES # BLD MANUAL: 1.1 10*3/MM3 (ref 0.7–3.1)
LYMPHOCYTES NFR BLD MANUAL: 4 % (ref 5–12)
LYMPHOCYTES NFR BLD MANUAL: 44 % (ref 19.6–45.3)
MAGNESIUM SERPL-MCNC: 1.7 MG/DL (ref 1.6–2.4)
MCH RBC QN AUTO: 34 PG (ref 26.6–33)
MCHC RBC AUTO-ENTMCNC: 34.4 G/DL (ref 31.5–35.7)
MCV RBC AUTO: 98.8 FL (ref 79–97)
MONOCYTES # BLD AUTO: 0.09 10*3/MM3 (ref 0.1–0.9)
NEUTROPHILS # BLD AUTO: 1.1 10*3/MM3 (ref 1.7–7)
NEUTROPHILS NFR BLD MANUAL: 37 % (ref 42.7–76)
NEUTS BAND NFR BLD MANUAL: 11 % (ref 0–5)
PHOSPHATE SERPL-MCNC: 4.4 MG/DL (ref 2.5–4.5)
PLATELET # BLD AUTO: 77 10*3/MM3 (ref 140–450)
PMV BLD AUTO: 9.7 FL (ref 6–12)
POTASSIUM SERPL-SCNC: 3.9 MMOL/L (ref 3.5–5.2)
RBC # BLD AUTO: 2.93 10*6/MM3 (ref 4.14–5.8)
SCAN SLIDE: NORMAL
SMALL PLATELETS BLD QL SMEAR: ABNORMAL
SODIUM SERPL-SCNC: 138 MMOL/L (ref 136–145)
VARIANT LYMPHS NFR BLD MANUAL: 4 % (ref 0–5)
WBC # BLD AUTO: 2.3 10*3/MM3 (ref 3.4–10.8)
WBC MORPH BLD: NORMAL

## 2021-05-28 PROCEDURE — 25010000002 CEFEPIME PER 500 MG: Performed by: INTERNAL MEDICINE

## 2021-05-28 PROCEDURE — 97530 THERAPEUTIC ACTIVITIES: CPT

## 2021-05-28 PROCEDURE — 99232 SBSQ HOSP IP/OBS MODERATE 35: CPT | Performed by: INTERNAL MEDICINE

## 2021-05-28 PROCEDURE — 94760 N-INVAS EAR/PLS OXIMETRY 1: CPT

## 2021-05-28 PROCEDURE — 83883 ASSAY NEPHELOMETRY NOT SPEC: CPT | Performed by: NURSE PRACTITIONER

## 2021-05-28 PROCEDURE — 83735 ASSAY OF MAGNESIUM: CPT | Performed by: INTERNAL MEDICINE

## 2021-05-28 PROCEDURE — 94799 UNLISTED PULMONARY SVC/PX: CPT

## 2021-05-28 PROCEDURE — 63710000001 PREDNISONE PER 5 MG: Performed by: INTERNAL MEDICINE

## 2021-05-28 PROCEDURE — 77075 RADEX OSSEOUS SURVEY COMPL: CPT

## 2021-05-28 PROCEDURE — 82784 ASSAY IGA/IGD/IGG/IGM EACH: CPT | Performed by: NURSE PRACTITIONER

## 2021-05-28 PROCEDURE — 85025 COMPLETE CBC W/AUTO DIFF WBC: CPT | Performed by: INTERNAL MEDICINE

## 2021-05-28 PROCEDURE — 80069 RENAL FUNCTION PANEL: CPT | Performed by: INTERNAL MEDICINE

## 2021-05-28 PROCEDURE — 85007 BL SMEAR W/DIFF WBC COUNT: CPT | Performed by: INTERNAL MEDICINE

## 2021-05-28 PROCEDURE — 97535 SELF CARE MNGMENT TRAINING: CPT

## 2021-05-28 PROCEDURE — 82785 ASSAY OF IGE: CPT | Performed by: NURSE PRACTITIONER

## 2021-05-28 PROCEDURE — 83605 ASSAY OF LACTIC ACID: CPT | Performed by: INTERNAL MEDICINE

## 2021-05-28 PROCEDURE — 97116 GAIT TRAINING THERAPY: CPT

## 2021-05-28 PROCEDURE — 86334 IMMUNOFIX E-PHORESIS SERUM: CPT | Performed by: NURSE PRACTITIONER

## 2021-05-28 RX ORDER — CEFDINIR 300 MG/1
300 CAPSULE ORAL EVERY 12 HOURS SCHEDULED
Status: DISCONTINUED | OUTPATIENT
Start: 2021-05-28 | End: 2021-06-01 | Stop reason: HOSPADM

## 2021-05-28 RX ORDER — ASPIRIN 81 MG/1
81 TABLET ORAL DAILY
Status: DISCONTINUED | OUTPATIENT
Start: 2021-05-29 | End: 2021-06-01 | Stop reason: HOSPADM

## 2021-05-28 RX ORDER — FAMOTIDINE 20 MG/1
20 TABLET, FILM COATED ORAL
Status: DISCONTINUED | OUTPATIENT
Start: 2021-05-28 | End: 2021-06-01 | Stop reason: HOSPADM

## 2021-05-28 RX ADMIN — IPRATROPIUM BROMIDE AND ALBUTEROL SULFATE 3 ML: 2.5; .5 SOLUTION RESPIRATORY (INHALATION) at 11:51

## 2021-05-28 RX ADMIN — GABAPENTIN 400 MG: 400 CAPSULE ORAL at 08:19

## 2021-05-28 RX ADMIN — HYDROCODONE BITARTRATE AND ACETAMINOPHEN 1 TABLET: 7.5; 325 TABLET ORAL at 06:35

## 2021-05-28 RX ADMIN — CEFEPIME 2 G: 2 INJECTION, POWDER, FOR SOLUTION INTRAVENOUS at 00:41

## 2021-05-28 RX ADMIN — Medication 10 ML: at 21:07

## 2021-05-28 RX ADMIN — Medication 10 ML: at 08:20

## 2021-05-28 RX ADMIN — GABAPENTIN 400 MG: 400 CAPSULE ORAL at 16:51

## 2021-05-28 RX ADMIN — FOLIC ACID 500 MCG: 1 TABLET ORAL at 08:19

## 2021-05-28 RX ADMIN — IPRATROPIUM BROMIDE AND ALBUTEROL SULFATE 3 ML: 2.5; .5 SOLUTION RESPIRATORY (INHALATION) at 08:57

## 2021-05-28 RX ADMIN — PREDNISONE 5 MG: 5 TABLET ORAL at 08:19

## 2021-05-28 RX ADMIN — GABAPENTIN 400 MG: 400 CAPSULE ORAL at 21:07

## 2021-05-28 RX ADMIN — CEFEPIME 2 G: 2 INJECTION, POWDER, FOR SOLUTION INTRAVENOUS at 08:19

## 2021-05-28 RX ADMIN — Medication 1 PACKET: at 21:07

## 2021-05-28 RX ADMIN — IPRATROPIUM BROMIDE AND ALBUTEROL SULFATE 3 ML: 2.5; .5 SOLUTION RESPIRATORY (INHALATION) at 20:02

## 2021-05-28 RX ADMIN — CEFDINIR 300 MG: 300 CAPSULE ORAL at 21:07

## 2021-05-28 RX ADMIN — Medication 1 PACKET: at 08:19

## 2021-05-28 RX ADMIN — HYDROCODONE BITARTRATE AND ACETAMINOPHEN 1 TABLET: 7.5; 325 TABLET ORAL at 16:56

## 2021-05-28 RX ADMIN — FAMOTIDINE 20 MG: 20 TABLET ORAL at 16:51

## 2021-05-28 RX ADMIN — CEFEPIME 2 G: 2 INJECTION, POWDER, FOR SOLUTION INTRAVENOUS at 16:51

## 2021-05-28 RX ADMIN — IPRATROPIUM BROMIDE AND ALBUTEROL SULFATE 3 ML: 2.5; .5 SOLUTION RESPIRATORY (INHALATION) at 15:15

## 2021-05-28 RX ADMIN — Medication 100 MG: at 08:19

## 2021-05-29 LAB
ALBUMIN SERPL-MCNC: 2.7 G/DL (ref 3.5–5.2)
ANION GAP SERPL CALCULATED.3IONS-SCNC: 10 MMOL/L (ref 5–15)
BASOPHILS # BLD AUTO: 0 10*3/MM3 (ref 0–0.2)
BASOPHILS NFR BLD AUTO: 0.6 % (ref 0–1.5)
BUN SERPL-MCNC: 44 MG/DL (ref 8–23)
BUN/CREAT SERPL: 32.4 (ref 7–25)
CALCIUM SPEC-SCNC: 8.9 MG/DL (ref 8.6–10.5)
CHLORIDE SERPL-SCNC: 105 MMOL/L (ref 98–107)
CO2 SERPL-SCNC: 23 MMOL/L (ref 22–29)
COPPER SERPL-MCNC: 159 UG/DL (ref 69–132)
CREAT SERPL-MCNC: 1.36 MG/DL (ref 0.76–1.27)
DEPRECATED RDW RBC AUTO: 54.3 FL (ref 37–54)
EOSINOPHIL # BLD AUTO: 0.1 10*3/MM3 (ref 0–0.4)
EOSINOPHIL NFR BLD AUTO: 4.3 % (ref 0.3–6.2)
ERYTHROCYTE [DISTWIDTH] IN BLOOD BY AUTOMATED COUNT: 15.5 % (ref 12.3–15.4)
GFR SERPL CREATININE-BSD FRML MDRD: 53 ML/MIN/1.73
GLUCOSE SERPL-MCNC: 98 MG/DL (ref 65–99)
HCT VFR BLD AUTO: 29.5 % (ref 37.5–51)
HGB BLD-MCNC: 10.2 G/DL (ref 13–17.7)
KAPPA LC FREE SER-MCNC: 97.9 MG/L (ref 3.3–19.4)
KAPPA LC FREE/LAMBDA FREE SER: 1.61 {RATIO} (ref 0.26–1.65)
LAMBDA LC FREE SERPL-MCNC: 60.8 MG/L (ref 5.7–26.3)
LYMPHOCYTES # BLD AUTO: 1.5 10*3/MM3 (ref 0.7–3.1)
LYMPHOCYTES NFR BLD AUTO: 53.8 % (ref 19.6–45.3)
MCH RBC QN AUTO: 34.1 PG (ref 26.6–33)
MCHC RBC AUTO-ENTMCNC: 34.6 G/DL (ref 31.5–35.7)
MCV RBC AUTO: 98.5 FL (ref 79–97)
MONOCYTES # BLD AUTO: 0.1 10*3/MM3 (ref 0.1–0.9)
MONOCYTES NFR BLD AUTO: 3.7 % (ref 5–12)
NEUTROPHILS NFR BLD AUTO: 1.1 10*3/MM3 (ref 1.7–7)
NEUTROPHILS NFR BLD AUTO: 37.6 % (ref 42.7–76)
NRBC BLD AUTO-RTO: 0.5 /100 WBC (ref 0–0.2)
PHOSPHATE SERPL-MCNC: 3.5 MG/DL (ref 2.5–4.5)
PLATELET # BLD AUTO: 95 10*3/MM3 (ref 140–450)
PMV BLD AUTO: 9 FL (ref 6–12)
POTASSIUM SERPL-SCNC: 4.1 MMOL/L (ref 3.5–5.2)
RBC # BLD AUTO: 2.99 10*6/MM3 (ref 4.14–5.8)
SODIUM SERPL-SCNC: 138 MMOL/L (ref 136–145)
WBC # BLD AUTO: 2.8 10*3/MM3 (ref 3.4–10.8)

## 2021-05-29 PROCEDURE — 63710000001 PREDNISONE PER 5 MG: Performed by: INTERNAL MEDICINE

## 2021-05-29 PROCEDURE — 84156 ASSAY OF PROTEIN URINE: CPT | Performed by: NURSE PRACTITIONER

## 2021-05-29 PROCEDURE — 80069 RENAL FUNCTION PANEL: CPT | Performed by: INTERNAL MEDICINE

## 2021-05-29 PROCEDURE — 85025 COMPLETE CBC W/AUTO DIFF WBC: CPT | Performed by: INTERNAL MEDICINE

## 2021-05-29 PROCEDURE — 94799 UNLISTED PULMONARY SVC/PX: CPT

## 2021-05-29 PROCEDURE — 81050 URINALYSIS VOLUME MEASURE: CPT | Performed by: NURSE PRACTITIONER

## 2021-05-29 PROCEDURE — 86335 IMMUNFIX E-PHORSIS/URINE/CSF: CPT | Performed by: NURSE PRACTITIONER

## 2021-05-29 PROCEDURE — 97116 GAIT TRAINING THERAPY: CPT

## 2021-05-29 PROCEDURE — 99232 SBSQ HOSP IP/OBS MODERATE 35: CPT | Performed by: INTERNAL MEDICINE

## 2021-05-29 PROCEDURE — 84166 PROTEIN E-PHORESIS/URINE/CSF: CPT | Performed by: NURSE PRACTITIONER

## 2021-05-29 RX ADMIN — FAMOTIDINE 20 MG: 20 TABLET ORAL at 16:35

## 2021-05-29 RX ADMIN — Medication 10 ML: at 08:45

## 2021-05-29 RX ADMIN — FAMOTIDINE 20 MG: 20 TABLET ORAL at 08:42

## 2021-05-29 RX ADMIN — Medication 100 MG: at 08:42

## 2021-05-29 RX ADMIN — HYDROCODONE BITARTRATE AND ACETAMINOPHEN 1 TABLET: 7.5; 325 TABLET ORAL at 16:36

## 2021-05-29 RX ADMIN — IPRATROPIUM BROMIDE AND ALBUTEROL SULFATE 3 ML: 2.5; .5 SOLUTION RESPIRATORY (INHALATION) at 18:48

## 2021-05-29 RX ADMIN — GABAPENTIN 400 MG: 400 CAPSULE ORAL at 08:42

## 2021-05-29 RX ADMIN — Medication 1 PACKET: at 20:51

## 2021-05-29 RX ADMIN — Medication 1 PACKET: at 08:44

## 2021-05-29 RX ADMIN — Medication 10 ML: at 20:51

## 2021-05-29 RX ADMIN — GABAPENTIN 400 MG: 400 CAPSULE ORAL at 20:51

## 2021-05-29 RX ADMIN — IPRATROPIUM BROMIDE AND ALBUTEROL SULFATE 3 ML: 2.5; .5 SOLUTION RESPIRATORY (INHALATION) at 15:07

## 2021-05-29 RX ADMIN — PREDNISONE 5 MG: 5 TABLET ORAL at 08:42

## 2021-05-29 RX ADMIN — ASPIRIN 81 MG: 81 TABLET, COATED ORAL at 08:42

## 2021-05-29 RX ADMIN — GABAPENTIN 400 MG: 400 CAPSULE ORAL at 16:35

## 2021-05-29 RX ADMIN — CEFDINIR 300 MG: 300 CAPSULE ORAL at 20:51

## 2021-05-29 RX ADMIN — CEFDINIR 300 MG: 300 CAPSULE ORAL at 08:42

## 2021-05-29 RX ADMIN — FOLIC ACID 500 MCG: 1 TABLET ORAL at 08:42

## 2021-05-29 RX ADMIN — HYDROCODONE BITARTRATE AND ACETAMINOPHEN 1 TABLET: 7.5; 325 TABLET ORAL at 08:42

## 2021-05-30 LAB
ANION GAP SERPL CALCULATED.3IONS-SCNC: 10 MMOL/L (ref 5–15)
BACTERIA SPEC AEROBE CULT: NORMAL
BACTERIA SPEC AEROBE CULT: NORMAL
BASOPHILS # BLD AUTO: 0 10*3/MM3 (ref 0–0.2)
BASOPHILS NFR BLD AUTO: 1 % (ref 0–1.5)
BUN SERPL-MCNC: 38 MG/DL (ref 8–23)
BUN/CREAT SERPL: 30.4 (ref 7–25)
CALCIUM SPEC-SCNC: 8.9 MG/DL (ref 8.6–10.5)
CHLORIDE SERPL-SCNC: 101 MMOL/L (ref 98–107)
CO2 SERPL-SCNC: 22 MMOL/L (ref 22–29)
CREAT SERPL-MCNC: 1.25 MG/DL (ref 0.76–1.27)
DEPRECATED RDW RBC AUTO: 53.4 FL (ref 37–54)
EOSINOPHIL # BLD AUTO: 0.1 10*3/MM3 (ref 0–0.4)
EOSINOPHIL NFR BLD AUTO: 5.3 % (ref 0.3–6.2)
ERYTHROCYTE [DISTWIDTH] IN BLOOD BY AUTOMATED COUNT: 15.5 % (ref 12.3–15.4)
GFR SERPL CREATININE-BSD FRML MDRD: 58 ML/MIN/1.73
GLUCOSE SERPL-MCNC: 94 MG/DL (ref 65–99)
HCT VFR BLD AUTO: 30 % (ref 37.5–51)
HGB BLD-MCNC: 10.4 G/DL (ref 13–17.7)
LYMPHOCYTES # BLD AUTO: 1.1 10*3/MM3 (ref 0.7–3.1)
LYMPHOCYTES NFR BLD AUTO: 45.2 % (ref 19.6–45.3)
MCH RBC QN AUTO: 33.5 PG (ref 26.6–33)
MCHC RBC AUTO-ENTMCNC: 34.8 G/DL (ref 31.5–35.7)
MCV RBC AUTO: 96.2 FL (ref 79–97)
MONOCYTES # BLD AUTO: 0.1 10*3/MM3 (ref 0.1–0.9)
MONOCYTES NFR BLD AUTO: 3.8 % (ref 5–12)
NEUTROPHILS NFR BLD AUTO: 1.1 10*3/MM3 (ref 1.7–7)
NEUTROPHILS NFR BLD AUTO: 44.7 % (ref 42.7–76)
NRBC BLD AUTO-RTO: 0.1 /100 WBC (ref 0–0.2)
PLATELET # BLD AUTO: 96 10*3/MM3 (ref 140–450)
PMV BLD AUTO: 8.9 FL (ref 6–12)
POTASSIUM SERPL-SCNC: 4.1 MMOL/L (ref 3.5–5.2)
RBC # BLD AUTO: 3.11 10*6/MM3 (ref 4.14–5.8)
SODIUM SERPL-SCNC: 133 MMOL/L (ref 136–145)
WBC # BLD AUTO: 2.4 10*3/MM3 (ref 3.4–10.8)

## 2021-05-30 PROCEDURE — 97112 NEUROMUSCULAR REEDUCATION: CPT

## 2021-05-30 PROCEDURE — 99232 SBSQ HOSP IP/OBS MODERATE 35: CPT | Performed by: INTERNAL MEDICINE

## 2021-05-30 PROCEDURE — 63710000001 PREDNISONE PER 5 MG: Performed by: INTERNAL MEDICINE

## 2021-05-30 PROCEDURE — 97116 GAIT TRAINING THERAPY: CPT

## 2021-05-30 PROCEDURE — 80048 BASIC METABOLIC PNL TOTAL CA: CPT | Performed by: INTERNAL MEDICINE

## 2021-05-30 PROCEDURE — 85025 COMPLETE CBC W/AUTO DIFF WBC: CPT | Performed by: INTERNAL MEDICINE

## 2021-05-30 RX ADMIN — HYDROCODONE BITARTRATE AND ACETAMINOPHEN 1 TABLET: 7.5; 325 TABLET ORAL at 10:37

## 2021-05-30 RX ADMIN — CEFDINIR 300 MG: 300 CAPSULE ORAL at 10:37

## 2021-05-30 RX ADMIN — FAMOTIDINE 20 MG: 20 TABLET ORAL at 17:21

## 2021-05-30 RX ADMIN — Medication 10 ML: at 10:37

## 2021-05-30 RX ADMIN — GABAPENTIN 400 MG: 400 CAPSULE ORAL at 17:21

## 2021-05-30 RX ADMIN — CEFDINIR 300 MG: 300 CAPSULE ORAL at 20:06

## 2021-05-30 RX ADMIN — ASPIRIN 81 MG: 81 TABLET, COATED ORAL at 10:37

## 2021-05-30 RX ADMIN — Medication 100 MG: at 10:37

## 2021-05-30 RX ADMIN — HYDROCODONE BITARTRATE AND ACETAMINOPHEN 1 TABLET: 7.5; 325 TABLET ORAL at 19:19

## 2021-05-30 RX ADMIN — Medication 10 ML: at 20:07

## 2021-05-30 RX ADMIN — Medication 1 PACKET: at 20:07

## 2021-05-30 RX ADMIN — FOLIC ACID 500 MCG: 1 TABLET ORAL at 10:37

## 2021-05-30 RX ADMIN — HYDROCODONE BITARTRATE AND ACETAMINOPHEN 1 TABLET: 7.5; 325 TABLET ORAL at 00:44

## 2021-05-30 RX ADMIN — GABAPENTIN 400 MG: 400 CAPSULE ORAL at 20:06

## 2021-05-30 RX ADMIN — Medication 1 PACKET: at 10:38

## 2021-05-30 RX ADMIN — FAMOTIDINE 20 MG: 20 TABLET ORAL at 10:37

## 2021-05-30 RX ADMIN — PREDNISONE 5 MG: 5 TABLET ORAL at 10:37

## 2021-05-30 RX ADMIN — GABAPENTIN 400 MG: 400 CAPSULE ORAL at 10:37

## 2021-05-31 LAB
ANION GAP SERPL CALCULATED.3IONS-SCNC: 10 MMOL/L (ref 5–15)
BASOPHILS # BLD AUTO: 0 10*3/MM3 (ref 0–0.2)
BASOPHILS NFR BLD AUTO: 0.9 % (ref 0–1.5)
BUN SERPL-MCNC: 29 MG/DL (ref 8–23)
BUN/CREAT SERPL: 24.6 (ref 7–25)
CALCIUM SPEC-SCNC: 8.8 MG/DL (ref 8.6–10.5)
CHLORIDE SERPL-SCNC: 103 MMOL/L (ref 98–107)
CO2 SERPL-SCNC: 24 MMOL/L (ref 22–29)
CREAT SERPL-MCNC: 1.18 MG/DL (ref 0.76–1.27)
DEPRECATED RDW RBC AUTO: 52.1 FL (ref 37–54)
EOSINOPHIL # BLD AUTO: 0.2 10*3/MM3 (ref 0–0.4)
EOSINOPHIL NFR BLD AUTO: 8.6 % (ref 0.3–6.2)
ERYTHROCYTE [DISTWIDTH] IN BLOOD BY AUTOMATED COUNT: 15.2 % (ref 12.3–15.4)
GFR SERPL CREATININE-BSD FRML MDRD: 62 ML/MIN/1.73
GLUCOSE SERPL-MCNC: 96 MG/DL (ref 65–99)
HCT VFR BLD AUTO: 28.3 % (ref 37.5–51)
HGB BLD-MCNC: 9.8 G/DL (ref 13–17.7)
LYMPHOCYTES # BLD AUTO: 1.2 10*3/MM3 (ref 0.7–3.1)
LYMPHOCYTES NFR BLD AUTO: 52.9 % (ref 19.6–45.3)
MCH RBC QN AUTO: 33.8 PG (ref 26.6–33)
MCHC RBC AUTO-ENTMCNC: 34.6 G/DL (ref 31.5–35.7)
MCV RBC AUTO: 97.6 FL (ref 79–97)
MONOCYTES # BLD AUTO: 0.1 10*3/MM3 (ref 0.1–0.9)
MONOCYTES NFR BLD AUTO: 5.3 % (ref 5–12)
NEUTROPHILS NFR BLD AUTO: 0.7 10*3/MM3 (ref 1.7–7)
NEUTROPHILS NFR BLD AUTO: 32.3 % (ref 42.7–76)
NRBC BLD AUTO-RTO: 0.3 /100 WBC (ref 0–0.2)
PLATELET # BLD AUTO: 96 10*3/MM3 (ref 140–450)
PMV BLD AUTO: 9.1 FL (ref 6–12)
POTASSIUM SERPL-SCNC: 3.7 MMOL/L (ref 3.5–5.2)
RBC # BLD AUTO: 2.9 10*6/MM3 (ref 4.14–5.8)
SODIUM SERPL-SCNC: 137 MMOL/L (ref 136–145)
WBC # BLD AUTO: 2.3 10*3/MM3 (ref 3.4–10.8)

## 2021-05-31 PROCEDURE — 94799 UNLISTED PULMONARY SVC/PX: CPT

## 2021-05-31 PROCEDURE — 85025 COMPLETE CBC W/AUTO DIFF WBC: CPT | Performed by: INTERNAL MEDICINE

## 2021-05-31 PROCEDURE — 63710000001 PREDNISONE PER 5 MG: Performed by: INTERNAL MEDICINE

## 2021-05-31 PROCEDURE — 80048 BASIC METABOLIC PNL TOTAL CA: CPT | Performed by: INTERNAL MEDICINE

## 2021-05-31 PROCEDURE — 99232 SBSQ HOSP IP/OBS MODERATE 35: CPT | Performed by: INTERNAL MEDICINE

## 2021-05-31 RX ORDER — CEFDINIR 300 MG/1
300 CAPSULE ORAL EVERY 12 HOURS SCHEDULED
Qty: 8 CAPSULE | Refills: 0 | Status: SHIPPED | OUTPATIENT
Start: 2021-05-31 | End: 2021-06-01

## 2021-05-31 RX ORDER — ARGININE/GLUTAMINE/CALCIUM BMB 7G-7G-1.5G
1 POWDER IN PACKET (EA) ORAL 2 TIMES DAILY
Start: 2021-05-31 | End: 2021-06-01

## 2021-05-31 RX ORDER — ASPIRIN 81 MG/1
81 TABLET ORAL DAILY
Start: 2021-06-01

## 2021-05-31 RX ORDER — FAMOTIDINE 20 MG/1
20 TABLET, FILM COATED ORAL
Start: 2021-05-31 | End: 2021-06-01

## 2021-05-31 RX ORDER — FOLIC ACID 1 MG/1
500 TABLET ORAL DAILY
Start: 2021-06-01 | End: 2021-06-01

## 2021-05-31 RX ADMIN — FAMOTIDINE 20 MG: 20 TABLET ORAL at 08:44

## 2021-05-31 RX ADMIN — IPRATROPIUM BROMIDE AND ALBUTEROL SULFATE 3 ML: 2.5; .5 SOLUTION RESPIRATORY (INHALATION) at 14:52

## 2021-05-31 RX ADMIN — IPRATROPIUM BROMIDE AND ALBUTEROL SULFATE 3 ML: 2.5; .5 SOLUTION RESPIRATORY (INHALATION) at 12:13

## 2021-05-31 RX ADMIN — Medication 10 ML: at 20:55

## 2021-05-31 RX ADMIN — IPRATROPIUM BROMIDE AND ALBUTEROL SULFATE 3 ML: 2.5; .5 SOLUTION RESPIRATORY (INHALATION) at 08:20

## 2021-05-31 RX ADMIN — IPRATROPIUM BROMIDE AND ALBUTEROL SULFATE 3 ML: 2.5; .5 SOLUTION RESPIRATORY (INHALATION) at 19:20

## 2021-05-31 RX ADMIN — GABAPENTIN 400 MG: 400 CAPSULE ORAL at 08:44

## 2021-05-31 RX ADMIN — HYDROCODONE BITARTRATE AND ACETAMINOPHEN 1 TABLET: 7.5; 325 TABLET ORAL at 11:55

## 2021-05-31 RX ADMIN — Medication 100 MG: at 08:44

## 2021-05-31 RX ADMIN — PREDNISONE 5 MG: 5 TABLET ORAL at 08:43

## 2021-05-31 RX ADMIN — CEFDINIR 300 MG: 300 CAPSULE ORAL at 08:44

## 2021-05-31 RX ADMIN — Medication 1 PACKET: at 20:56

## 2021-05-31 RX ADMIN — FOLIC ACID 500 MCG: 1 TABLET ORAL at 08:43

## 2021-05-31 RX ADMIN — GABAPENTIN 400 MG: 400 CAPSULE ORAL at 18:18

## 2021-05-31 RX ADMIN — HYDROCODONE BITARTRATE AND ACETAMINOPHEN 1 TABLET: 7.5; 325 TABLET ORAL at 03:13

## 2021-05-31 RX ADMIN — HYDROCODONE BITARTRATE AND ACETAMINOPHEN 1 TABLET: 7.5; 325 TABLET ORAL at 20:56

## 2021-05-31 RX ADMIN — Medication 10 ML: at 08:44

## 2021-05-31 RX ADMIN — ASPIRIN 81 MG: 81 TABLET, COATED ORAL at 08:44

## 2021-05-31 RX ADMIN — GABAPENTIN 400 MG: 400 CAPSULE ORAL at 20:55

## 2021-05-31 RX ADMIN — CEFDINIR 300 MG: 300 CAPSULE ORAL at 20:55

## 2021-05-31 RX ADMIN — Medication 1 PACKET: at 08:44

## 2021-05-31 RX ADMIN — FAMOTIDINE 20 MG: 20 TABLET ORAL at 18:18

## 2021-06-01 VITALS
WEIGHT: 245.81 LBS | DIASTOLIC BLOOD PRESSURE: 72 MMHG | TEMPERATURE: 97.8 F | SYSTOLIC BLOOD PRESSURE: 117 MMHG | HEIGHT: 70 IN | RESPIRATION RATE: 18 BRPM | OXYGEN SATURATION: 100 % | HEART RATE: 78 BPM | BODY MASS INDEX: 35.19 KG/M2

## 2021-06-01 LAB
ANION GAP SERPL CALCULATED.3IONS-SCNC: 10 MMOL/L (ref 5–15)
BASOPHILS # BLD MANUAL: 0.02 10*3/MM3 (ref 0–0.2)
BASOPHILS NFR BLD AUTO: 1 % (ref 0–1.5)
BUN SERPL-MCNC: 27 MG/DL (ref 8–23)
BUN/CREAT SERPL: 25 (ref 7–25)
CALCIUM SPEC-SCNC: 8.3 MG/DL (ref 8.6–10.5)
CHLORIDE SERPL-SCNC: 105 MMOL/L (ref 98–107)
CO2 SERPL-SCNC: 22 MMOL/L (ref 22–29)
CREAT SERPL-MCNC: 1.08 MG/DL (ref 0.76–1.27)
DEPRECATED RDW RBC AUTO: 51.6 FL (ref 37–54)
EOSINOPHIL # BLD MANUAL: 0.18 10*3/MM3 (ref 0–0.4)
EOSINOPHIL NFR BLD MANUAL: 9 % (ref 0.3–6.2)
ERYTHROCYTE [DISTWIDTH] IN BLOOD BY AUTOMATED COUNT: 15.1 % (ref 12.3–15.4)
GFR SERPL CREATININE-BSD FRML MDRD: 69 ML/MIN/1.73
GLUCOSE SERPL-MCNC: 96 MG/DL (ref 65–99)
HCT VFR BLD AUTO: 28.4 % (ref 37.5–51)
HGB BLD-MCNC: 9.7 G/DL (ref 13–17.7)
IGA SERPL-MCNC: 580 MG/DL (ref 61–437)
IGG SERPL-MCNC: 1297 MG/DL (ref 603–1613)
IGM SERPL-MCNC: 182 MG/DL (ref 20–172)
LYMPHOCYTES # BLD MANUAL: 1.28 10*3/MM3 (ref 0.7–3.1)
LYMPHOCYTES NFR BLD MANUAL: 2 % (ref 5–12)
LYMPHOCYTES NFR BLD MANUAL: 62 % (ref 19.6–45.3)
MACROCYTES BLD QL SMEAR: ABNORMAL
MCH RBC QN AUTO: 33.6 PG (ref 26.6–33)
MCHC RBC AUTO-ENTMCNC: 34 G/DL (ref 31.5–35.7)
MCV RBC AUTO: 98.8 FL (ref 79–97)
METAMYELOCYTES NFR BLD MANUAL: 1 % (ref 0–0)
MONOCYTES # BLD AUTO: 0.04 10*3/MM3 (ref 0.1–0.9)
NEUTROPHILS # BLD AUTO: 0.46 10*3/MM3 (ref 1.7–7)
NEUTROPHILS NFR BLD MANUAL: 17 % (ref 42.7–76)
NEUTS BAND NFR BLD MANUAL: 6 % (ref 0–5)
PLAT MORPH BLD: NORMAL
PLATELET # BLD AUTO: 104 10*3/MM3 (ref 140–450)
PMV BLD AUTO: 8.7 FL (ref 6–12)
POTASSIUM SERPL-SCNC: 4.1 MMOL/L (ref 3.5–5.2)
PROT PATTERN SERPL IFE-IMP: ABNORMAL
RBC # BLD AUTO: 2.88 10*6/MM3 (ref 4.14–5.8)
SCAN SLIDE: NORMAL
SODIUM SERPL-SCNC: 137 MMOL/L (ref 136–145)
VARIANT LYMPHS NFR BLD MANUAL: 2 % (ref 0–5)
WBC # BLD AUTO: 2 10*3/MM3 (ref 3.4–10.8)
WBC MORPH BLD: NORMAL

## 2021-06-01 PROCEDURE — 80048 BASIC METABOLIC PNL TOTAL CA: CPT | Performed by: INTERNAL MEDICINE

## 2021-06-01 PROCEDURE — 97116 GAIT TRAINING THERAPY: CPT

## 2021-06-01 PROCEDURE — 85007 BL SMEAR W/DIFF WBC COUNT: CPT | Performed by: INTERNAL MEDICINE

## 2021-06-01 PROCEDURE — 97530 THERAPEUTIC ACTIVITIES: CPT

## 2021-06-01 PROCEDURE — 63710000001 PREDNISONE PER 5 MG: Performed by: INTERNAL MEDICINE

## 2021-06-01 PROCEDURE — 99239 HOSP IP/OBS DSCHRG MGMT >30: CPT | Performed by: INTERNAL MEDICINE

## 2021-06-01 PROCEDURE — 94799 UNLISTED PULMONARY SVC/PX: CPT

## 2021-06-01 PROCEDURE — 85025 COMPLETE CBC W/AUTO DIFF WBC: CPT | Performed by: INTERNAL MEDICINE

## 2021-06-01 RX ORDER — FAMOTIDINE 20 MG/1
20 TABLET, FILM COATED ORAL
Qty: 60 TABLET | Refills: 0 | Status: SHIPPED | OUTPATIENT
Start: 2021-06-01 | End: 2021-07-01

## 2021-06-01 RX ORDER — CEFDINIR 300 MG/1
300 CAPSULE ORAL EVERY 12 HOURS SCHEDULED
Qty: 8 CAPSULE | Refills: 0 | Status: SHIPPED | OUTPATIENT
Start: 2021-06-01 | End: 2021-06-05

## 2021-06-01 RX ORDER — FOLIC ACID 1 MG/1
500 TABLET ORAL DAILY
Start: 2021-06-01 | End: 2021-07-04

## 2021-06-01 RX ORDER — ARGININE/GLUTAMINE/CALCIUM BMB 7G-7G-1.5G
1 POWDER IN PACKET (EA) ORAL 2 TIMES DAILY
Qty: 60 EACH | Refills: 3 | Status: SHIPPED | OUTPATIENT
Start: 2021-06-01 | End: 2021-07-01

## 2021-06-01 RX ORDER — NICOTINE 21 MG/24HR
1 PATCH, TRANSDERMAL 24 HOURS TRANSDERMAL EVERY 24 HOURS
Status: DISCONTINUED | OUTPATIENT
Start: 2021-06-01 | End: 2021-06-01 | Stop reason: HOSPADM

## 2021-06-01 RX ADMIN — PREDNISONE 5 MG: 5 TABLET ORAL at 10:07

## 2021-06-01 RX ADMIN — Medication 10 ML: at 10:06

## 2021-06-01 RX ADMIN — FOLIC ACID 500 MCG: 1 TABLET ORAL at 10:07

## 2021-06-01 RX ADMIN — HYDROCODONE BITARTRATE AND ACETAMINOPHEN 1 TABLET: 7.5; 325 TABLET ORAL at 05:35

## 2021-06-01 RX ADMIN — Medication 100 MG: at 10:16

## 2021-06-01 RX ADMIN — HYDROCODONE BITARTRATE AND ACETAMINOPHEN 1 TABLET: 7.5; 325 TABLET ORAL at 13:24

## 2021-06-01 RX ADMIN — ASPIRIN 81 MG: 81 TABLET, COATED ORAL at 10:07

## 2021-06-01 RX ADMIN — Medication 1 PACKET: at 10:09

## 2021-06-01 RX ADMIN — GABAPENTIN 400 MG: 400 CAPSULE ORAL at 10:07

## 2021-06-01 RX ADMIN — IPRATROPIUM BROMIDE AND ALBUTEROL SULFATE 3 ML: 2.5; .5 SOLUTION RESPIRATORY (INHALATION) at 08:18

## 2021-06-01 RX ADMIN — Medication 1 PATCH: at 10:16

## 2021-06-01 RX ADMIN — CEFDINIR 300 MG: 300 CAPSULE ORAL at 10:07

## 2021-06-01 RX ADMIN — FAMOTIDINE 20 MG: 20 TABLET ORAL at 10:07

## 2021-06-01 NOTE — DISCHARGE PLACEMENT REQUEST
"New home health referral    Contact  Mayuri AUGUSTINE RN  PH# [phone]    Raz Pyle (61 y.o. Male)     Date of Birth Social Security Number Address Home Phone MRN    1960  [address] [place] [address] [phone] [id number]    [employer] Mar[employer] Status          None Significant Other       Admission Date Admission Type Admitting Provider Attending Provider Department, Room/Bed    21 Urgent Lynette Silva DO Gad, George Fayez Labib Youssief, MD [facility] 3A MEDICAL INPATIENT, 303/1    Discharge Date Discharge Disposition Discharge Destination         Skilled Nursing Facility (DC - External)              Attending Provider: Uziel Rodriguez MD    Allergies: No Known Allergies    Isolation: None   Infection: None   Code Status: CPR    Ht: 177.8 cm (70\")   Wt: 112 kg (245 lb 13 oz)    Admission Cmt: None   Principal Problem: Neutropenia (CMS/HCC) [D70.9]                 Active Insurance as of 2021     Primary Coverage     Payor Plan Insurance Group Employer/Plan Group    HUMANA MEDICARE REPLACEMENT HUMANA MEDICARE REPLACEMENT [id number]     Payor Plan Address Payor Plan Phone Number Payor Plan Fax Number Effective Dates    [address] [address] [phone]  2020 - None Entered    [facility] [address]       Subscriber Name Subscriber Birth Date Member ID       RAZ PYLE 1960 [id number]                 Emergency Contacts      (Rel.) Home Phone Work Phone Mobile Phone    YAQUELIN REYES (Significant Other) [phone] -- [phone]        [facility] 3A MEDICAL INPATIENT  [address] [place] [address]  Phone:  [phone]  Fax:  [phone] Date: 2021      Ambulatory Referral to Home Health     Patient:  Raz Pyle MRN:  [id number]   [address] [place] [address] :  1960  SSN:    Phone: [phone] Sex:  M      INSURANCE PAYOR PLAN GROUP # SUBSCRIBER ID   Primary:    " HUMANA MEDICARE REPLACEMENT [id number]      Referring Provider Information:  TASHA RODRIGUEZ Phone: [phone] Fax: [phone]      Referral Information:   # Visits:  1 Referral Type: Home Health [42]   Urgency:  Routine Referral Reason: Patient Preference   Start Date: Jun 1, 2021 End Date:  To be determined by Insurer   Diagnosis: Pressure injury of left thigh, unstageable (CMS/[facility]) (L89.220 [ICD-10-CM] 707.09,707.25 [ICD-9-CM])      Refer to Dept:   Refer to Provider:   Refer to Facility:       Face to Face Visit Date: 5/31/2021  Follow-up provider for Plan of Care? I treated the patient in an acute care facility and will not continue treatment after discharge.  Follow-up provider: CAIN BRUSH [[id number]]  Reason/Clinical Findings: post hospital evaluation  Describe mobility limitations that make leaving home difficult: weakness  Nursing/Therapeutic Services Requested: Other ([facility] to evaluate )  Frequency: 1 Week 1     This document serves as a request of services and does not constitute Insurance authorization or approval of services.  To determine eligibility, please contact the members Insurance carrier to verify and review coverage.     If you have medical questions regarding this request for services. Please contact [address] MEDICAL INPATIENT at [phone] during normal business hours.       Verbal Order Mode: Telephone with readback   Authorizing Provider: Tasha Rodriguez MD  Authorizing Provider's NPI: [id number]     Order Entered By: Mayuri Serna RN 6/1/2021  8:58 AM     Electronically signed by:                 History & Physical      Eleonora Quinones APRN at 05/25/21 1922     Attestation signed by Lynette Silva DO at 05/26/21 1171    Attending attestation:    I reviewed the nurse practitioner's note and agree with the documented findings and plan of care.                          [facility] Medicine  Services      Patient Name: Raz Valencia  : 1960  MRN: [id number]  Primary Care Physician: Leisa Malik PA-C  Date of admission: 2021    Patient Care Team:  Leisa Malik PA-C as PCP - General (Physician Assistant)          Subjective   History Present Illness           Chief Complaint: shortness of breath      History of present illness:      Mr. Valencia is a 61 y.o. male W/PMH of Felty's syndrome, A. fib, EtOH abuse, bone necrosis, thrombocytopenic disorder, PVD, LEENA, neutropenia, nicotine dependence resents to [facility] as a transfer [facility] due to neutropenia, right lower lobe pneumonia.  Chart review shows patient seen in the [facility] ED on 2021 with a 1 week history of fever, body aches and decreased appetite.  Patient was given IV fluids/ABX and discharged home with the instructions to follow-up with PCP due to low WBC count and renal failure.  ED contacted patient today with positive blood culture for gram-positive cocci, patient called an ambulance to return to [facility] ED. Patient states dyspnea has progressively worsened over the last week, dyspnea is worse with exertion and relieved with rest.  Patient admits to fever, cough, chills, body aches, tachycardia, palpitations edema, sputum production.  Patient denies syncope, nausea, vomiting, diarrhea.        Upon arrival to the [facility] ED vital signs were temp 97.7, HR 94, RR 20, /55, O2 sat 98% on room air. Labs notable for , K3.9, chloride 106, CO2 20, glucose 101, BUN 33, creatinine 1.70, GFR 42, ALT 19, AST 34, alkaline phosphatase 82, total bili 1.3, calcium 9.1, lactic 1.8, WBC 1.2, Hgb 11.2, platelets 60, neutrophils 11.  XR 1 view shows patchy peripheral airspace opacities in the right lung field and bibasilar atelectasis and/or consolidation. Patient treated with 1 g IV meropenem, 1 L normal saline bolus, 1000 mg IV vancomycin in the [facility] ED.        History of Present  Illness    Review of Systems   Constitutional: Positive for chills, decreased appetite, diaphoresis and malaise/fatigue.   HENT: Positive for congestion.    Eyes: Negative for visual disturbance.   Cardiovascular: Positive for chest pain, dyspnea on exertion, irregular heartbeat, leg swelling, orthopnea and palpitations.   Respiratory: Positive for cough, shortness of breath, sleep disturbances due to breathing and sputum production.    Endocrine: Negative.    Hematologic/Lymphatic: Negative.    Skin: Positive for color change.   Musculoskeletal: Positive for joint pain and joint swelling.   Gastrointestinal: Positive for anorexia. Negative for bloating, abdominal pain, melena, nausea and vomiting.   Genitourinary: Negative for dysuria, frequency and pelvic pain.   Neurological: Positive for disturbances in coordination, loss of balance and weakness. Negative for headaches and vertigo.   Psychiatric/Behavioral: Positive for substance abuse.   All other systems reviewed and are negative.          Personal History     Past Medical History:   Past Medical History:   Diagnosis Date   • Neutropenic fever (CMS/HCC) 05/25/2021   • Pneumonia 05/25/2021   • Rheumatoid arthritis (CMS/[facility])        Surgical History:    History reviewed. No pertinent surgical history.        Family History:  pertinent FHx was reviewed and unremarkable.     Social History:  reports that he has been smoking. He has been smoking about 3.00 packs per day. He has never used smokeless tobacco. He reports current alcohol use of about 4.0 standard drinks of alcohol per week. He reports previous drug use.      Medications:  Prior to Admission medications    Medication Sig Start Date End Date Taking? Authorizing Provider   gabapentin (NEURONTIN) 400 MG capsule Take 400 mg by mouth 3 (Three) Times a Day.   Yes Provider, MD Gavin   HYDROcodone-acetaminophen (NORCO) 7.5-325 MG per tablet Take 1 tablet by mouth Every 4 (Four) Hours As Needed for Moderate  Pain .   Yes Provider, Historical, MD       Allergies:  No Known Allergies    Objective   Objective     Vital Signs  Temp:  [98 °F (36.7 °C)] 98 °F (36.7 °C)  Heart Rate:  [81-86] 86  Resp:  [18] 18  BP: (148)/(69) 148/69  SpO2:  [95 %-98 %] 98 %  on   ;   Device (Oxygen Therapy): room air  There is no height or weight on file to calculate BMI.    Physical Exam  Vitals and nursing note reviewed.   Constitutional:       Appearance: He is ill-appearing.   HENT:      Head: Normocephalic and atraumatic.   Eyes:      Conjunctiva/sclera: Conjunctivae normal.   Cardiovascular:      Rate and Rhythm: Normal rate. Rhythm irregularly irregular.      Pulses: Normal pulses.      Heart sounds: Normal heart sounds and S1 normal.   Pulmonary:      Effort: Tachypnea present.      Breath sounds: Examination of the right-upper field reveals rales. Examination of the left-upper field reveals rales. Examination of the right-middle field reveals rales. Examination of the left-middle field reveals rales. Examination of the right-lower field reveals rales. Examination of the left-lower field reveals rales. Rales present.   Abdominal:      General: Bowel sounds are normal.      Palpations: Abdomen is soft.   Musculoskeletal:      Cervical back: Full passive range of motion without pain and normal range of motion.      Right lower leg: 3+ Edema present.      Left lower leg: 3+ Edema present.   Skin:     Coloration: Skin is cyanotic and pale.      Findings: Ecchymosis present.   Neurological:      Mental Status: He is alert and oriented to person, place, and time. Mental status is at baseline.      Motor: Weakness present.      Coordination: Coordination abnormal.   Psychiatric:         Attention and Perception: Attention and perception normal.         Mood and Affect: Affect is blunt.         Speech: Speech normal.         Behavior: Behavior normal. Behavior is cooperative.         Thought Content: Thought content normal.         Cognition  and Memory: Cognition and memory normal.         Judgment: Judgment normal.           Results Review:          Results from last 7 days   Lab Units 05/1960   SODIUM mmol/L 135*   POTASSIUM mmol/L 4.1   CHLORIDE mmol/L 105   CO2 mmol/L 16.0*   BUN mg/dL 29*   CREATININE mg/dL 1.26   GLUCOSE mg/dL 81   CALCIUM mg/dL 8.2*   ALT (SGPT) U/L 12   AST (SGOT) U/L 20   PROBNP pg/mL 7,361.0*     CrCl cannot be calculated (Unknown ideal weight.).  Brief Urine Lab Results     None          Microbiology Results (last 10 days)     Procedure Component Value - Date/Time    Legionella Antigen, Urine - Urine, Urine, Clean Catch [[id number]]  (Normal) Collected: 05/25/21 2106    Lab Status: Final result Specimen: Urine, Clean Catch Updated: 05/25/21 2129     LEGIONELLA ANTIGEN, URINE Negative    S. Pneumo Ag Urine or CSF - Urine, Urine, Clean Catch [[id number]]  (Normal) Collected: 05/25/21 2106    Lab Status: Final result Specimen: Urine, Clean Catch Updated: 05/25/21 2129     Strep Pneumo Ag Negative          ECG/EMG Results (most recent)     Procedure Component Value Units Date/Time    ECG 12 Lead [[id number]] Collected: 05/25/21 1944     Updated: 05/25/21 1945     QT Interval 423 ms     Narrative:      HEART RATE= 75  bpm  RR Interval= 801  ms  AK Interval=   ms  P Horizontal Axis=   deg  P Front Axis=   deg  QRSD Interval= 87  ms  QT Interval= 423  ms  QRS Axis= 0  deg  T Wave Axis= -43  deg  - ABNORMAL ECG -  Atrial fibrillation  Abnrm T, consider ischemia, anterolateral lds  Electronically Signed By:   Date and Time of Study: 2021-05-25 19:44:23                        No radiology results for the last 7 days      CrCl cannot be calculated (Unknown ideal weight.).    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     Neutropenia:  -Methotrexate stopped in 2021 due to increasing anemia, neutropenia  -Upon arrival to the [facility] ED today WBC 1.2  -Hematology consultation ordered  -Placed in isolation for neutropenic precautions  -Neutropenic diet ordered    Pneumonia:   -XR chest 1 view at [facility] shows patchy peripheral airspace opacities in the right lung field and bibasilar atelectasis and/or consolidation  -PA and lateral chest x-ray ordered  -In the [facility] ED 1 g IV meropenem, 1000 mg IV vancomycin given  -IV Rocephin, IV doxycycline ordered  -Actiq acid every 6 hours x3 to monitor for potential sepsis  -IV Solu-Medrol 40 mg every 8 hours  -DuoNeb nebulizer treatments 4 times daily  -Oscillating positive expiratory device ordered 4 times daily  -O2 as needed to keep sats greater than 90%    MARTINEZ:  - Creatine: 1.26, BUN: 29, BUN/creatinine ratio: 23, (Baseline creatinine: unknown)  -UA , urine sodium  -Avoid nephrotoxic medication and IV dye unless urgently needed  -Monitor BMP and I's and O's while admitted    Felty syndrome/rheumatoid arthritis:  -Hold oral prednisone due to current IV steroid order  -Continue home gabapentin 400 mg 3 times daily, inspect reviewed  -Norco 7.5 mg every 8 hours as needed for severe pain, inspect review shows current prescription for every 4 as needed has   -650 mg acetaminophen every 4 as needed for mild pain    EtOH abuse: ETOH abuse:  Initiate EtOH withdrawal  protocol  -Thiamine 100 mg p.o. daily  -Folic acid 500 mg p.o. daily  -Monitor vital signs every 4hrs  -Seizure precautions  -Ammonia level    Thrombocytopenic disorder:  -In the [facility] ED platelet count 60  -Hematology consult ordered  -Check CBC in a.m.    Atrial fibrillation:  -EKG ordered upon arrival which showed atrial fibrillation rate 75 abnormal T, consider ischemia anterolateral leads  -No current home medication  -Cardiogram ordered to evaluate LV function  -Vital signs every 4 hours    Bilateral lower extremity edema:  -BNP ordered  -Fall precautions  -Bedrest with bedside commode use with assistance  -Echocardiogram ordered to evaluate LV function            VTE Prophylaxis -   Mechanical Order History:      Ordered        21  Place Sequential Compression Device  Once         21  Maintain Sequential Compression Device  Continuous                 Pharmalogical Order History:     None          CODE STATUS:    Code Status and Medical Interventions:   Ordered at: 21 183     Level Of Support Discussed With:    Patient     Code Status:    CPR     Medical Interventions (Level of Support Prior to Arrest):    Full       This patient has been interviewed wearing appropriate personal protective equipment.    I discussed the patient's findings and my recommendations with patient.    Signature:Electronically signed by TITUS Pedersen, 21, 9:49 PM EDT.      [facility] Hospitalist Team          Electronically signed by Lynette Silva DO at 21 0751          Physician Progress Notes (last 24 hours) (Notes from 21 0859 through 21 0859)      Rex Shepherd MD at 21 0758          Hematology/Oncology Inpatient Progress Note    PATIENT NAME: Raz Valencia  : 1960  MRN: [id number]    CHIEF COMPLAINT: Shortness of breath     HISTORY OF PRESENT ILLNESS:    61 y.o. male presented to Psychiatric on 2021 as a transfer from [facility]  [facility]  due to neutropenia and right lower lobe pneumonia.  Patient had initially presented to [facility] emergency department on 5/22/2021 with a one week history of fever, decreased appetite, and myalgias.  Patient was given IV fluids and antibiotics and was discharged home with instructions to follow-up with primary care physician due to low white blood count and renal failure.  Their emergency department contacted the patient on 5/25/2021 with instructions to return to the hospital due to a positive blood culture for gram-positive cocci.  His dyspnea had gotten worse over the prior one week.  The patient had additional complaints of fever, productive cough, chills, body aches, tachycardia, and palpitations. Labs in the [facility] emergency department were as follows: White blood count 1.2, hemoglobin 11.2, platelets 60,000, , sodium 135, potassium 3.9, chloride 106, CO2 20, glucose 101, BUN 33, creatinine 1.7, ALT 19, AST 34, alkaline phosphatase 82, total bili 1.3, calcium 9.3, lactic acid 1.8.  A chest x-ray showed patchy peripheral airspace opacities in the right lung field and bibasilar atelectasis and/or consolidation.  He received 1 g of IV meropenem, 1 L of normal saline,  and IV vancomycin at [facility]. Blood cultures were drawn on arrival to [facility]. Infectious disease was consulted.  The patient was started on IV Rocephin and doxycycline.  A chest x-ray on 5/26/2021 revealed vague patchy bilateral airspace opacities likely representing atypical infection.      05/26/21  Hematology/Oncology was consulted by  TITUS Pedersen  for leukopenia. A review of [facility] showed the patient was previously seen for anemia and neutropenia on 03/23/2021 by Dr. Nick Espana, Oncologist, at [facility] for worsening pancytopenia.  Patient has a diagnosis of Felty's  syndrome. He had been started on treatment for rheumatoid arthritis with methotrexate and was tolerating without difficulty.  In January of 2021, his methotrexate was stopped due to anemia and neutropenia. At that time, white blood cell count was 2.3, hemoglobin 12.1, platelets 126,000, ANC 0.0.  Comparatively CBC on 10/24/2019 showed white blood count 5.5, hemoglobin 12.5, platelets 125,000. He had a bone marrow biopsy in 2013 that showed minimally decreased cellularity with marked decrease in granulopoiesis, predominance of T lymphocytes with rare small non-paratrabecular B-cell lymphoid aggregate. No indication of acute leukemia, lymphoma, metastatic tumor or granulomatous infiltrate.  It was was initially thought that his worsening pancytopenia could have been related to methotrexate use. Recommendation was made to continue to hold methotrexate and repeat a CBC in May 2021.  If there was any persistent worsening of blood counts, then the plan was for potentially to repeat a bone marrow biopsy.     Past Medical History: Felty's syndrome, leukopenia, neutropenic fever (05/25/2021), pneumonia (05/25/2021), alcohol abuse, tobacco abuse, peripheral vascular disease, iron deficiency anemia, and rheumatoid arthritis.  Surgical History:  has no past surgical history on file.  Social History: He resides in [place], [place].  He reports that he has been smoking.about 3 packs per day. He has never used smokeless tobacco. He reports current alcohol use of about 4 standard drinks of alcohol per week. He reports previous drug use.  Family History: Family history is not on file.  Allergies:No Known Allergies     PCP: Leisa Malik PA-C    INTERVAL HISTORY:  · 5/27/2021 -White blood count 1.3, hemoglobin 10.2, MCV 97.9, platelets 63,000, . Occult blood stool - negative, reticulocytes 1.1 (0.70-1.90), haptoglobin 231 (), iron 50 (), iron saturation 28 (20-50), transferrin 118 (200-360), TIBC 176 (298-536),  ferritin 2555 (), folate 10.5 (4.78-24.20), vitamin B12 1910 (211-946), CMV IgM <30.0 (<30.0), EBV IgM <36 (<36).  PT 11.6 (9.6-11.7), INR 1.06 (0.93-1.10), PTT 29.5 (24.0-31.0). Peripheral blood smear showed reactive lymphocytes present. Records reviewed [facility].  CT chest 5/26/2021- Abnormal appearance of the lungs which can be seen with an atypical infection such as Covid. Trace layering pleural effusions. Mildly enlarged mediastinal lymph nodes in the right paratracheal region. Splenomegaly is suspected.  Ultrasound of spleen showed size at upper limits of normal (14.1cm). Bilateral gynecomastia. 2D echocardiogram-5/25/2021-Left ventricular wall thickness is consistent with mild concentric hypertrophy, estimated left ventricular EF = 60%, left ventricular systolic function is normal, and estimated right ventricular systolic pressure from tricuspid regurgitation is normal.   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  SPEP showed M spike x2 (0.23 and 0.21g/dL in gamma region).  SPEP this admission showed an M spike of 0.4 g/dL.  Flow cytometry showed markedly decreased neutrophils with an increase in T cells, consistent with T-LGL's.  White count 2.3, ANC 1.1, 4% atypical lymphocytes, hemoglobin 10, platelet count 77.  · 5/29/21-bone survey showed an infrarenal aortic aneurysm measuring 4.4 cm.  There was small nonspecific lucent lesions at C2 (6 mm), and right humeral head (5 mm).  WBC 2.8, ANC 1.1, hemoglobin 10.2, platelets 95.  Copper 159 ().  · 5/31/2021-WBC 2.3, hemoglobin 9.8 and a platelet count 96,000.    History of present illness reviewed since last visit and changes noted on 06/01/21.    Subjective   Feels all right and wanted to go home.  Did have bowel incontinence yesterday.  Walking.      ROS:  Review of Systems   Constitutional: Negative for activity change, chills, fatigue,  fever and unexpected weight change.   HENT: Negative for congestion, dental problem, hearing loss, mouth sores, nosebleeds, sore throat and trouble swallowing.    Eyes: Negative for photophobia, pain and visual disturbance.   Respiratory: Negative for cough, chest tightness and shortness of breath.    Cardiovascular: Negative for chest pain, palpitations and leg swelling.   Gastrointestinal: Negative for abdominal distention, abdominal pain, blood in stool, constipation, diarrhea, nausea and vomiting.   Endocrine: Negative for cold intolerance and heat intolerance.   Genitourinary: Negative for decreased urine volume, difficulty urinating, dysuria, frequency, hematuria and urgency.   Musculoskeletal: Negative for arthralgias and gait problem.   Skin: Negative for rash and wound.   Neurological: Negative for dizziness, tremors, seizures, syncope, weakness, light-headedness, numbness and headaches.   Hematological: Negative for adenopathy. Does not bruise/bleed easily.   Psychiatric/Behavioral: Negative for confusion and hallucinations. The patient is not nervous/anxious.    All other systems reviewed and are negative.     MEDICATIONS:    Scheduled Meds:  aspirin, 81 mg, Oral, Daily  cefdinir, 300 mg, Oral, Q12H  famotidine, 20 mg, Oral, BID AC  folic acid, 500 mcg, Oral, Daily  gabapentin, 400 mg, Oral, TID  ipratropium-albuterol, 3 mL, Nebulization, 4x Daily - RT  Andreas, 1 packet, Oral, BID  predniSONE, 5 mg, Oral, Daily  sodium chloride, 10 mL, Intravenous, Q12H  thiamine, 100 mg, Oral, Daily       Continuous Infusions:  sodium chloride, 50 mL/hr, Last Rate: 50 mL/hr (05/27/21 1249)       PRN Meds:  •  acetaminophen **OR** acetaminophen **OR** acetaminophen  •  HYDROcodone-acetaminophen  •  LORazepam **OR** LORazepam **OR** LORazepam **OR** LORazepam **OR** LORazepam **OR** LORazepam  •  melatonin  •  ondansetron **OR** ondansetron  •  sodium chloride     ALLERGIES:  No Known Allergies    Objective    VITALS:   BP  "137/70 (BP Location: Right arm, Patient Position: Lying)   Pulse 76   Temp 98 °F (36.7 °C) (Oral)   Resp 18   Ht 177.8 cm (70\")   Wt 112 kg (245 lb 13 oz)   SpO2 100%   BMI 35.27 kg/m²     PHYSICAL EXAM:  Physical Exam  Vitals and nursing note reviewed.   Constitutional:       General: He is not in acute distress.     Appearance: Normal appearance. He is well-developed. He is obese. He is not diaphoretic.   HENT:      Head: Normocephalic and atraumatic.      Right Ear: External ear normal.      Left Ear: External ear normal.      Nose: Nose normal. No rhinorrhea.      Mouth/Throat:      Mouth: Mucous membranes are moist.      Dentition: Abnormal dentition ( edentulous ).      Pharynx: Oropharynx is clear. No oropharyngeal exudate or posterior oropharyngeal erythema.      Comments: Edentulous  Eyes:      General: No scleral icterus.        Right eye: No discharge.         Left eye: No discharge.      Extraocular Movements: Extraocular movements intact.      Conjunctiva/sclera: Conjunctivae normal.      Pupils: Pupils are equal, round, and reactive to light.   Cardiovascular:      Rate and Rhythm: Normal rate and regular rhythm.      Heart sounds: Normal heart sounds. No murmur heard.        Comments: Cardiac monitor leads.   Pulmonary:      Effort: Pulmonary effort is normal. No respiratory distress.      Breath sounds: Normal breath sounds. No stridor. No wheezing or rales.   Abdominal:      General: Bowel sounds are normal. There is no distension.      Palpations: Abdomen is soft. There is no mass.      Tenderness: There is no abdominal tenderness. There is no guarding or rebound.      Comments: Obese abdomen   Genitourinary:     Comments: Deferred   Musculoskeletal:         General: No swelling, tenderness or deformity. Normal range of motion.      Cervical back: Normal range of motion and neck supple.      Right lower leg: No edema.      Left lower leg: No edema.      Comments: Left upper extremity " peripheral IV   Lymphadenopathy:      Cervical: No cervical adenopathy.      Upper Body:      Right upper body: No supraclavicular adenopathy.      Left upper body: No supraclavicular adenopathy.   Skin:     General: Skin is warm and dry.      Coloration: Skin is not pale.      Findings: Bruising and ecchymosis ( BUE) present. No erythema or rash.      Comments: Stasis changes to bilateral lower extremities.   Neurological:      General: No focal deficit present.      Mental Status: He is alert and oriented to person, place, and time.      Coordination: Coordination normal.   Psychiatric:         Mood and Affect: Mood normal.         Behavior: Behavior normal.         Thought Content: Thought content normal.         Judgment: Judgment normal.         RECENT LABS:  Lab Results (last 24 hours)     Procedure Component Value Units Date/Time    Manual Differential [[id number]]  (Abnormal) Collected: 06/01/21 0230    Specimen: Blood Updated: 06/01/21 0612     Neutrophil % 17.0 %      Lymphocyte % 62.0 %      Monocyte % 2.0 %      Eosinophil % 9.0 %      Basophil % 1.0 %      Bands %  6.0 %      Metamyelocyte % 1.0 %      Atypical Lymphocyte % 2.0 %      Neutrophils Absolute 0.46 10*3/mm3      Lymphocytes Absolute 1.28 10*3/mm3      Monocytes Absolute 0.04 10*3/mm3      Eosinophils Absolute 0.18 10*3/mm3      Basophils Absolute 0.02 10*3/mm3      Macrocytes Slight/1+     WBC Morphology Normal     Platelet Morphology Normal    CBC & Differential [[id number]]  (Abnormal) Collected: 06/01/21 0230    Specimen: Blood Updated: 06/01/21 0612    Narrative:      The following orders were created for panel order CBC & Differential.  Procedure                               Abnormality         Status                     ---------                               -----------         ------                     Scan Slide[[id number]]                                       Final result               CBC Auto Differential[[id number]]         Abnormal            Final result                 Please view results for these tests on the individual orders.    Scan Slide [[id number]] Collected: 06/01/21 0230    Specimen: Blood Updated: 06/01/21 0612     Scan Slide --     Comment: See Manual Differential Results       CBC Auto Differential [[id number]]  (Abnormal) Collected: 06/01/21 0230    Specimen: Blood Updated: 06/01/21 0612     WBC 2.00 10*3/mm3      RBC 2.88 10*6/mm3      Hemoglobin 9.7 g/dL      Hematocrit 28.4 %      MCV 98.8 fL      MCH 33.6 pg      MCHC 34.0 g/dL      RDW 15.1 %      RDW-SD 51.6 fl      MPV 8.7 fL      Platelets 104 10*3/mm3     Narrative:      The previously reported component NRBC is no longer being reported. Previous result was 0.2 /100 WBC (Reference Range: 0.0-0.2 /100 WBC) on 6/1/2021 at 0340 EDT.    Basic Metabolic Panel [[id number]]  (Abnormal) Collected: 06/01/21 0230    Specimen: Blood Updated: 06/01/21 0404     Glucose 96 mg/dL      BUN 27 mg/dL      Creatinine 1.08 mg/dL      Sodium 137 mmol/L      Potassium 4.1 mmol/L      Chloride 105 mmol/L      CO2 22.0 mmol/L      Calcium 8.3 mg/dL      eGFR Non African Amer 69 mL/min/1.73      BUN/Creatinine Ratio 25.0     Anion Gap 10.0 mmol/L     Narrative:      GFR Normal >60  Chronic Kidney Disease <60  Kidney Failure <15          PENDING RESULTS: T-cell gene rearrangement and cytogenetics on peripheral blood, SIFE,  immunoglobulins, free light chain ratio, UIFE, UPEP.    IMAGING REVIEWED:  No radiology results for the last day  I have reviewed the patient's labs, imaging, reports, and other clinician documentation.    Assessment/Plan   ASSESSMENT  1. 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  Plaquenil use a contributing factor.  Methotrexate was stopped in January 2021 due to worsening of anemia and neutropenia.  On folic acid. In 3/2021, white blood cell count 2.3, hemoglobin 12.1,  platelets 126,000, ANC was 0.0. Platelets had acutely declined. S/p bone marrow biopsy in 2013.  Pancytopenia work-up thus far showed no LEENA, nutritional deficiencies or hemolysis. CMV/EBV IgM's negative. Coags normal.  Peripheral blood flow showed increase in T LGL's suspicious for T-cell leukemia which could also be contributing to the cytopenias.  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  All counts improving.  2. IgG kappa MGUS-diagnosed 2019.  Urine studies, SIFE, light chain ratio and immunoglobulins pending.  Bone survey showed 2 small nonspecific lucent lesions- one in cervical spine and one in right humerus.  Calcium normal and creatinine mildly elevated.    3. Mediastinal Lymphadenopathy: seen on CT Chest 5/26/2021. Spleen upper limits of normal.   4. Pneumonia: CXR and chest CT both showed atypical infection. On cefdinir. Infectious disease consulted.  Patient was reported with positive blood culture 5/22/2021 for gram positive cocci at outlying facility but those results can not be found-blood cultures here-no growth to date.  Afebrile.  5. Rheumatoid arthritis: Off methotrexate.  On oral prednisone and Plaquenil on hold due to cytopenias.  Started Pepcid.  On gabapentin and Norco for pain.      6. Atrial fibrillation with RVR/elevated BNP: Cardiology consulted. On ASA 81 mg. 2D echocardiogram showed LVEF of 60%.  7. Acute kidney injury: Appears to be secondary to diuretics, Lasix has been d/c'ed. Nephrology consulted.  8. Alcohol abuse/Tobacco abuse/infrarenal aortic aneurysm:  Management per Primary team.      PLAN  1. Follow CBC.  2. Await T-cell gene rearrangement and cytogenetics on peripheral blood.   3. Continue to hold Plaquenil.  4. Follow-up chest CT as outpatient.  5. SIFE, Free light chain ratio, immunoglobulins, UIFE and UPEP all pending.   6. We will schedule outpatient follow-up here or in [place].      I discussed the patients findings and my  recommendations with patient.    Electronically signed by Rex Shepherd MD, 06/01/21, 8:01 AM EDT.              Electronically signed by Rex Shepherd MD at 06/01/21 0801     Sohiela Hensley MD at 05/31/21 1740          Infectious Diseases Progress Note      LOS: 6 days   Patient Care Team:  Leisa Malik PA-C as PCP - General (Physician Assistant)  Rex Shepherd MD as Consulting Physician (Hematology and Oncology)    Chief Complaint: Shortness of breath, fatigue    Subjective       The patient has been afebrile for the last 24 hours.  The patient is on room air, hemodynamically stable, and is tolerating antimicrobial therapy.        Review of Systems:   Review of Systems   Constitutional: Positive for fatigue.   HENT: Negative.    Eyes: Negative.    Respiratory: Positive for cough.    Cardiovascular: Negative.    Gastrointestinal: Negative.    Endocrine: Negative.    Genitourinary: Negative.    Musculoskeletal: Negative.    Skin: Negative.    Neurological: Negative.    Psychiatric/Behavioral: Negative.    All other systems reviewed and are negative.       Objective     Vital Signs  Temp:  [97.6 °F (36.4 °C)-97.9 °F (36.6 °C)] 97.8 °F (36.6 °C)  Heart Rate:  [66-95] 71  Resp:  [16-20] 20  BP: (100-118)/(58-70) 115/70    Physical Exam:  Physical Exam  Vitals and nursing note reviewed.   Constitutional:       General: He is not in acute distress.     Appearance: Normal appearance. He is well-developed and normal weight. He is not diaphoretic.   HENT:      Head: Normocephalic and atraumatic.   Eyes:      Conjunctiva/sclera: Conjunctivae normal.      Pupils: Pupils are equal, round, and reactive to light.   Cardiovascular:      Rate and Rhythm: Normal rate and regular rhythm.      Heart sounds: Normal heart sounds, S1 normal and S2 normal.   Pulmonary:      Effort: Pulmonary effort is normal. No respiratory distress.      Breath sounds: No stridor. Rales present. No wheezing.   Abdominal:      General:  Bowel sounds are normal. There is no distension.      Palpations: Abdomen is soft. There is no mass.      Tenderness: There is no abdominal tenderness. There is no guarding.   Musculoskeletal:         General: No deformity. Normal range of motion.      Cervical back: Neck supple.   Skin:     General: Skin is warm and dry.      Coloration: Skin is not pale.      Findings: No erythema or rash.   Neurological:      Mental Status: He is alert and oriented to person, place, and time.      Cranial Nerves: No cranial nerve deficit.   Psychiatric:         Mood and Affect: Mood normal.          Results Review:    I have reviewed all clinical data, test, lab, and imaging results.     Radiology  No Radiology Exams Resulted Within Past 24 Hours    Cardiology    Laboratory    Results from last 7 days   Lab Units 05/31/21  0307 05/30/21  0720 05/29/21  0423 05/28/21  0407 05/27/21  0215 05/26/21  1037   WBC 10*3/mm3 2.30* 2.40* 2.80* 2.30* 1.30* 0.60*   HEMOGLOBIN g/dL 9.8* 10.4* 10.2* 10.0* 10.2* 10.4*   HEMATOCRIT % 28.3* 30.0* 29.5* 29.0* 29.3* 30.4*   PLATELETS 10*3/mm3 96* 96* 95* 77* 63* 63*     Results from last 7 days   Lab Units 05/31/21  0307 05/30/21  0720 05/29/21  0423 05/28/21  0407 05/27/21  0215 05/26/21  1037 05/26/21  0810 05/25/21  2203 05/1960   SODIUM mmol/L 137 133* 138 138 139  --  135*  --  135*   POTASSIUM mmol/L 3.7 4.1 4.1 3.9 3.9  --  4.1  --  4.1   CHLORIDE mmol/L 103 101 105 105 108*  --  102  --  105   CO2 mmol/L 24.0 22.0 23.0 23.0 17.0*  --  21.0*  --  16.0*   BUN mg/dL 29* 38* 44* 51* 44*  --  31*  --  29*   CREATININE mg/dL 1.18 1.25 1.36* 1.33* 1.77*  --  1.23  --  1.26   GLUCOSE mg/dL 96 94 98 118* 146*  --  134*  --  81   ALBUMIN g/dL  --   --  2.70* 2.60* 2.50* 2.2*  --   --  2.40*   BILIRUBIN mg/dL  --   --   --   --  0.5  --   --   --  0.8   ALK PHOS U/L  --   --   --   --  121*  --   --   --  67   AST (SGOT) U/L  --   --   --   --  44*  --   --   --  20   ALT (SGPT) U/L  --   --    --   --  26  --   --   --  12   AMMONIA umol/L  --   --   --   --   --   --   --  <10*  --    CALCIUM mg/dL 8.8 8.9 8.9 8.6 8.5*  --  8.5*  --  8.2*                 Microbiology   Microbiology Results (last 10 days)     Procedure Component Value - Date/Time    Legionella Antigen, Urine - Urine, Urine, Clean Catch [[id number]]  (Normal) Collected: 05/25/21 2106    Lab Status: Final result Specimen: Urine, Clean Catch Updated: 05/25/21 2129     LEGIONELLA ANTIGEN, URINE Negative    S. Pneumo Ag Urine or CSF - Urine, Urine, Clean Catch [[id number]]  (Normal) Collected: 05/25/21 2106    Lab Status: Final result Specimen: Urine, Clean Catch Updated: 05/25/21 2129     Strep Pneumo Ag Negative    Respiratory Panel PCR w/COVID-19(SARS-CoV-2) JENNIE/MADALYN/MARY/PAD/COR/MAD/ARIANA In-House, NP Swab in UTM/VTM, 3-4 HR TAT - Swab, Nasopharynx [[id number]]  (Normal) Collected: 05/25/21 2032    Lab Status: Final result Specimen: Swab from Nasopharynx Updated: 05/25/21 2156     ADENOVIRUS, PCR Not Detected     Coronavirus 229E Not Detected     Coronavirus HKU1 Not Detected     Coronavirus NL63 Not Detected     Coronavirus OC43 Not Detected     COVID19 Not Detected     Human Metapneumovirus Not Detected     Human Rhinovirus/Enterovirus Not Detected     Influenza A PCR Not Detected     Influenza B PCR Not Detected     Parainfluenza Virus 1 Not Detected     Parainfluenza Virus 2 Not Detected     Parainfluenza Virus 3 Not Detected     Parainfluenza Virus 4 Not Detected     RSV, PCR Not Detected     Bordetella pertussis pcr Not Detected     Bordetella parapertussis PCR Not Detected     Chlamydophila pneumoniae PCR Not Detected     Mycoplasma pneumo by PCR Not Detected    Narrative:      In the setting of a positive respiratory panel with a viral infection PLUS a negative procalcitonin without other underlying concern for bacterial infection, consider observing off antibiotics or discontinuation of antibiotics and continue supportive care. If the  respiratory panel is positive for atypical bacterial infection (Bordetella pertussis, Chlamydophila pneumoniae, or Mycoplasma pneumoniae), consider antibiotic de-escalation to target atypical bacterial infection.    Blood Culture - Blood, Arm, Right [[id number]] Collected: 05/1960    Lab Status: Final result Specimen: Blood from Arm, Right Updated: 05/30/21 2045     Blood Culture No growth at 5 days    Blood Culture - Blood, Hand, Right [[id number]] Collected: 05/1960    Lab Status: Final result Specimen: Blood from Hand, Right Updated: 05/30/21 2045     Blood Culture No growth at 5 days          Medication Review:       Schedule Meds  aspirin, 81 mg, Oral, Daily  cefdinir, 300 mg, Oral, Q12H  famotidine, 20 mg, Oral, BID AC  folic acid, 500 mcg, Oral, Daily  gabapentin, 400 mg, Oral, TID  ipratropium-albuterol, 3 mL, Nebulization, 4x Daily - RT  Andreas, 1 packet, Oral, BID  predniSONE, 5 mg, Oral, Daily  sodium chloride, 10 mL, Intravenous, Q12H  thiamine, 100 mg, Oral, Daily        Infusion Meds  sodium chloride, 50 mL/hr, Last Rate: 50 mL/hr (05/27/21 1249)        PRN Meds  •  acetaminophen **OR** acetaminophen **OR** acetaminophen  •  HYDROcodone-acetaminophen  •  LORazepam **OR** LORazepam **OR** LORazepam **OR** LORazepam **OR** LORazepam **OR** LORazepam  •  melatonin  •  ondansetron **OR** ondansetron  •  sodium chloride        Assessment/Plan       Antimicrobial Therapy   1.  Omnicef     day  2.       day  3.      Day  4.      Day  5.      Day      Assessment     Right lower lobe infiltrate.  Probably community-acquired pneumonia. COVID-19 screen was negative.  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  Patient apparently been  treated with Plaquenil and prednisone  White count had improved and ANC is slightly above 1000     The hospitalist admission note reported that patient had positive blood culture at [facility] in [place].  Repeat blood culture from May 25, 2021 this facility is negative so far  -Patient's RN called [facility] microbiology/lab department when she could not get a hold of medical records and they reviewed their records and could not find any history of a positive blood culture for this patient    Plan     Continue Omnicef 300 mg p.o. twice daily for a total of 7 days  Continue supportive care  Okay to discharge from Infectious Disease standpoint    Soheila Hensley MD  05/31/21  17:40 EDT     Note is dictated utilizing voice recognition software/Dragon    Electronically signed by Soheila Hensley MD at 05/31/21 1740     MichaelUziel MD at 05/31/21 1237                  [facility] Medicine Services Daily Progress Note      Hospitalist Team  LOS 6 days      Patient Care Team:  Leisa Malik PA-C as PCP - General (Physician Assistant)  Rex Shepherd MD as Consulting Physician (Hematology and Oncology)    Patient Location: 303/1      Subjective   Subjective     Chief Complaint / Subjective  Fever, malaise, dyspnea      Brief Synopsis of Hospital Course/HPI  61 y.o. male W/PMH of Felty's syndrome, A. fib, EtOH abuse, bone necrosis, thrombocytopenic disorder, PVD, LEENA, neutropenia, nicotine dependence resents to [facility] as a transfer from [facility] due to neutropenia, right lower lobe pneumonia.  Chart review shows patient seen in the [facility] ED on 05/22/2021 with a 1 week history of fever, body aches and decreased appetite.  Patient was given IV fluids/ABX and discharged home with the instructions to follow-up with PCP due to low WBC count and renal failure.  ED contacted patient with positive blood culture for gram-positive  "yosephi, patient called an ambulance to return to [facility] ED. Patient states dyspnea has progressively worsened over the last week, dyspnea is worse with exertion and relieved with rest.  On admission, imaging showing bilateral pneumonia.  Started on broad-spectrum IV antibiotics, ID, hematology were consulted.  Did have new onset A. fib on admission, cardiology also consulted.  Initially looked volume overloaded so was diuresed however echo showed EF well-preserved and patient had MARTINEZ from diuresis, diuretics stopped.  Nephrology followed the patient.  Creatinine stable now.  Pancytopenia improved.  No bleeding noted.  Hematology work-up underway.  ID switched him to p.o. antibiotics and okay for discharge.  Cardiology recommending baby aspirin for anticoagulation, okay with hematology.  Patient not candidate for full anticoagulation due to thrombocytopenia.  PT/OT recommend rehab placement.  Patient stable for discharge to rehab awaiting pre-CERT.      Date::    5/26/2021: Patient seen examined this morning.  States he is feeling better compared to yesterday but still feels generalized weak and \"not well\".  Was on methotrexate previously, has history of blood disorder but is unsure of details.  Admits to drinking 4-5 beers pretty much every day however last drink was 1 week ago.  Denies any cardiac history or any cardiac work-up previously.    5/27/2021: Patient seen examined this morning.  Feels significantly better today, overall improving.  Generalized weakness also improving.  Denies any complaints today.  Creatinine trending up, nephrology consulted.    5/28/2021: Patient seen examined this morning.  Continues to feel better, weakness improving.  Creatinine also improving, discussed with cardiology regarding baby aspirin, will discuss with hematology.    5/29/2021: Patient seen examined this morning.  Doing well, continues to feel better.  No complaints.  Awaiting rehab placement.    5/30/2021: Patient seen and " "examined this morning.  Continues to do well, weakness improving.  Breathing stable.  No complaints today.  Awaiting rehab placement.    5/31  Patient feels much better now wants to go home  Data reports notes and imaging and labs reviewed        Objective   Objective      Vital Signs  Temp:  [97.5 °F (36.4 °C)-97.9 °F (36.6 °C)] 97.8 °F (36.6 °C)  Heart Rate:  [66-95] 75  Resp:  [16-18] 16  BP: (100-118)/(58-70) 115/70  Oxygen Therapy  SpO2: 98 %  Pulse Oximetry Type: Intermittent  Device (Oxygen Therapy): room air  Flowsheet Rows      First Filed Value   Admission Height  177.8 cm (70\") Documented at 05/26/2021 0027   Admission Weight  108 kg (238 lb 12.1 oz) Documented at 05/25/2021 1928        Intake & Output (last 3 days)       05/28 0701 - 05/29 0700 05/29 0701 - 05/30 0700 05/30 0701 - 05/31 0700 05/31 0701 - 06/01 0700    P.O. 960 1680 1080 240    IV Piggyback        Total Intake(mL/kg) 960 (9) 1680 (15.7) 1080 (10.1) 240 (2.2)    Urine (mL/kg/hr) 1150 (0.4) 1000 (0.4) 3700 (1.4) 400 (0.7)    Stool  0 0     Total Output 1150 1000 3700 400    Net -190 +680 -2620 -160            Urine Unmeasured Occurrence  1 x 1 x     Stool Unmeasured Occurrence  1 x 2 x         Lines, Drains & Airways    Active LDAs     Name:   Placement date:   Placement time:   Site:   Days:    Peripheral IV 05/25/21 1200 Left Antecubital   05/25/21    1200    Antecubital   less than 1                  Physical Exam:    General: Awake, alert, elderly male, eating breakfast, NAD  Eyes: PERRL, EOMI, conjunctive are clear  Cardiovascular: Regular rate and rhythm, no murmurs  Respiratory: Clear to auscultation bilaterally. No wheezing or rales, unlabored breathing  Abdomen: Soft, obese, nontender, positive bowel sounds, no guarding  Neurologic: A&O, CN grossly intact, moves all extremities spontaneously  Musculoskeletal: Generalized weakness noted, no gross deformities  Skin: Warm, dry, bilateral lower extremity dry, buttock wound " noted.           Wounds (last 24 hours)      LDA Wound     Row Name 05/31/21 0710 05/30/21 1930          Wound 05/26/21 0100 medial perineum    Wound - Properties Group Placement Date: 05/26/21 - Placement Time: 0100 -SH Present on Hospital Admission: Y  -SH Orientation: medial  -SH Location: perineum  -SH Stage, Pressure Injury : Stage 2  -SH    Dressing Appearance  --  dry;intact;no drainage  -SH     Closure  Open to air  -JM  Open to air  -SH     Base  dry  -JM  dry  -SH     Periwound  dry  -JM  dry  -SH     Periwound Temperature  warm  -JM  warm  -SH     Periwound Skin Turgor  soft  -JM  soft  -SH     Drainage Amount  none  -JM  none  -SH     Care, Wound  --  soap and water;barrier applied  -     Periwound Care  --  barrier film applied  -     Retired Wound - Properties Group Date first assessed: 05/26/21  - Time first assessed: 0100  -SH Present on Hospital Admission: Y  -SH Location: perineum  -SH       Wound 05/26/21 0102 Right coccyx Pressure Injury    Wound - Properties Group Placement Date: 05/26/21  - Placement Time: 0102  -SH Present on Hospital Admission: Y  -SH Side: Right  -SH Location: coccyx  -SH Primary Wound Type: Pressure inj  -SH Stage, Pressure Injury : Stage 2  -SH    Dressing Appearance  --  dry;intact;no drainage  -SH     Closure  Open to air  -JM  Open to air  -SH     Base  dry  -JM  dry  -SH     Periwound  intact;dry  -JM  intact;dry  -SH     Periwound Temperature  warm  -JM  warm  -SH     Periwound Skin Turgor  soft  -JM  soft  -SH     Drainage Amount  none  -JM  none  -SH     Care, Wound  --  soap and water;barrier applied  -SH     Periwound Care  --  barrier ointment applied  -     Retired Wound - Properties Group Date first assessed: 05/26/21  - Time first assessed: 0102 -SH Present on Hospital Admission: Y  -SH Side: Right  -SH Location: coccyx  -SH Primary Wound Type: Pressure inj  -SH       Wound 05/26/21 0108 Left posterior greater trochanter Pressure Injury     Wound - Properties Group Placement Date: 05/26/21  - Placement Time: 0108 - Present on Hospital Admission: Y  -SH Side: Left  - Orientation: posterior  - Location: greater trochanter  -SH Primary Wound Type: Pressure inj  -SH Stage, Pressure Injury : deep tissue injury  -    Dressing Appearance  --  no drainage  -     Closure  Adhesive bandage  -  Adhesive bandage  -     Base  black eschar;red;scab  -  black eschar;red;scab  -SH     Periwound  redness  -  redness  -     Periwound Temperature  warm  -  warm  -     Periwound Skin Turgor  soft  -  soft  -     Drainage Amount  none  -  none  -     Care, Wound  --  cleansed with;soap and water  -     Dressing Care  --  dressing changed mepilex with silver  -     Retired Wound - Properties Group Date first assessed: 05/26/21 - Time first assessed: 0108 -SH Present on Hospital Admission: Y  -SH Side: Left  -SH Location: greater trochanter  -SH Primary Wound Type: Pressure inj  -SH      User Key  (r) = Recorded By, (t) = Taken By, (c) = Cosigned By    Initials Name Provider Type    Hali Harvey RN Registered Nurse    Jailene Santiago RN Registered Nurse          Procedures:              Results Review:     I reviewed the patient's new clinical results.      Lab Results (last 24 hours)     Procedure Component Value Units Date/Time    Basic Metabolic Panel [[id number]]  (Abnormal) Collected: 05/31/21 0307    Specimen: Blood Updated: 05/31/21 0349     Glucose 96 mg/dL      BUN 29 mg/dL      Creatinine 1.18 mg/dL      Sodium 137 mmol/L      Potassium 3.7 mmol/L      Chloride 103 mmol/L      CO2 24.0 mmol/L      Calcium 8.8 mg/dL      eGFR Non African Amer 62 mL/min/1.73      BUN/Creatinine Ratio 24.6     Anion Gap 10.0 mmol/L     Narrative:      GFR Normal >60  Chronic Kidney Disease <60  Kidney Failure <15      CBC & Differential [[id number]]  (Abnormal) Collected: 05/31/21 0307    Specimen: Blood Updated: 05/31/21 0331     Narrative:      The following orders were created for panel order CBC & Differential.  Procedure                               Abnormality         Status                     ---------                               -----------         ------                     CBC Auto Differential[[id number]]        Abnormal            Final result                 Please view results for these tests on the individual orders.    CBC Auto Differential [[id number]]  (Abnormal) Collected: 05/31/21 0307    Specimen: Blood Updated: 05/31/21 0331     WBC 2.30 10*3/mm3      RBC 2.90 10*6/mm3      Hemoglobin 9.8 g/dL      Hematocrit 28.3 %      MCV 97.6 fL      MCH 33.8 pg      MCHC 34.6 g/dL      RDW 15.2 %      RDW-SD 52.1 fl      MPV 9.1 fL      Platelets 96 10*3/mm3      Neutrophil % 32.3 %      Lymphocyte % 52.9 %      Monocyte % 5.3 %      Eosinophil % 8.6 %      Basophil % 0.9 %      Neutrophils, Absolute 0.70 10*3/mm3      Lymphocytes, Absolute 1.20 10*3/mm3      Monocytes, Absolute 0.10 10*3/mm3      Eosinophils, Absolute 0.20 10*3/mm3      Basophils, Absolute 0.00 10*3/mm3      nRBC 0.3 /100 WBC     Blood Culture - Blood, Arm, Right [[id number]] Collected: 05/1960    Specimen: Blood from Arm, Right Updated: 05/30/21 2045     Blood Culture No growth at 5 days    Blood Culture - Blood, Hand, Right [[id number]] Collected: 05/1960    Specimen: Blood from Hand, Right Updated: 05/30/21 2045     Blood Culture No growth at 5 days        No results found for: HGBA1C  Results from last 7 days   Lab Units 05/26/21  1037   INR  1.06           No results found for: LIPASE  No results found for: CHOL, CHLPL, TRIG, HDL, LDL, LDLDIRECT    No results found for: INTRAOP, PREDX, FINALDX, COMDX    Microbiology Results (last 10 days)     Procedure Component Value - Date/Time    Legionella Antigen, Urine - Urine, Urine, Clean Catch [[id number]]  (Normal) Collected: 05/25/21 2106    Lab Status: Final result Specimen: Urine, Clean Catch Updated:  05/25/21 2129     LEGIONELLA ANTIGEN, URINE Negative    S. Pneumo Ag Urine or CSF - Urine, Urine, Clean Catch [[id number]]  (Normal) Collected: 05/25/21 2106    Lab Status: Final result Specimen: Urine, Clean Catch Updated: 05/25/21 2129     Strep Pneumo Ag Negative    Respiratory Panel PCR w/COVID-19(SARS-CoV-2) JENNIE/MADALYN/MARY/PAD/COR/MAD/ARIANA In-House, NP Swab in UTM/VTM, 3-4 HR TAT - Swab, Nasopharynx [[id number]]  (Normal) Collected: 05/25/21 2032    Lab Status: Final result Specimen: Swab from Nasopharynx Updated: 05/25/21 2156     ADENOVIRUS, PCR Not Detected     Coronavirus 229E Not Detected     Coronavirus HKU1 Not Detected     Coronavirus NL63 Not Detected     Coronavirus OC43 Not Detected     COVID19 Not Detected     Human Metapneumovirus Not Detected     Human Rhinovirus/Enterovirus Not Detected     Influenza A PCR Not Detected     Influenza B PCR Not Detected     Parainfluenza Virus 1 Not Detected     Parainfluenza Virus 2 Not Detected     Parainfluenza Virus 3 Not Detected     Parainfluenza Virus 4 Not Detected     RSV, PCR Not Detected     Bordetella pertussis pcr Not Detected     Bordetella parapertussis PCR Not Detected     Chlamydophila pneumoniae PCR Not Detected     Mycoplasma pneumo by PCR Not Detected    Narrative:      In the setting of a positive respiratory panel with a viral infection PLUS a negative procalcitonin without other underlying concern for bacterial infection, consider observing off antibiotics or discontinuation of antibiotics and continue supportive care. If the respiratory panel is positive for atypical bacterial infection (Bordetella pertussis, Chlamydophila pneumoniae, or Mycoplasma pneumoniae), consider antibiotic de-escalation to target atypical bacterial infection.    Blood Culture - Blood, Arm, Right [[id number]] Collected: 05/1960    Lab Status: Final result Specimen: Blood from Arm, Right Updated: 05/30/21 2045     Blood Culture No growth at 5 days    Blood Culture -  Blood, Hand, Right [[id number]] Collected: 05/1960    Lab Status: Final result Specimen: Blood from Hand, Right Updated: 05/30/21 2045     Blood Culture No growth at 5 days          ECG/EMG Results (most recent)     Procedure Component Value Units Date/Time    ECG 12 Lead [[id number]] Collected: 05/25/21 1944     Updated: 05/25/21 1945     QT Interval 423 ms     Narrative:      HEART RATE= 75  bpm  RR Interval= 801  ms  DE Interval=   ms  P Horizontal Axis=   deg  P Front Axis=   deg  QRSD Interval= 87  ms  QT Interval= 423  ms  QRS Axis= 0  deg  T Wave Axis= -43  deg  - ABNORMAL ECG -  Atrial fibrillation  Abnrm T, consider ischemia, anterolateral lds  Electronically Signed By:   Date and Time of Study: 2021-05-25 19:44:23    Adult Transthoracic Echo Complete w/ Color, Spectral and Contrast if Necessary Per Protocol [[id number]] Collected: 05/26/21 1609     Updated: 05/26/21 1947     BSA 2.2 m^2      RVIDd 3.5 cm      IVSd 1.3 cm      LVIDd 4.3 cm      LVIDs 3.2 cm      LVPWd 1.3 cm      IVS/LVPW 1.0     FS 26.2 %      EDV(Teich) 81.7 ml      ESV(Teich) 39.5 ml      EF(Teich) 51.6 %      EDV(cubed) 77.8 ml      ESV(cubed) 31.3 ml      EF(cubed) 59.7 %      LV mass(C)d 207.0 grams      LV mass(C)dI 92.5 grams/m^2      SV(Teich) 42.2 ml      SI(Teich) 18.8 ml/m^2      SV(cubed) 46.5 ml      SI(cubed) 20.7 ml/m^2      ACS 1.9 cm      LVOT diam 2.5 cm      LVOT area 5.0 cm^2      EDV(MOD-sp4) 46.6 ml      ESV(MOD-sp4) 14.2 ml      EF(MOD-sp4) 69.5 %      SV(MOD-sp4) 32.4 ml      SI(MOD-sp4) 14.5 ml/m^2      LV Loera Vol (BSA corrected) 20.8 ml/m^2      LV Sys Vol (BSA corrected) 6.3 ml/m^2      Aortic R-R 0.65 sec      Aortic HR 91.7 BPM      MV V2 max 90.6 cm/sec      MV max PG 3.3 mmHg      MV V2 mean 46.8 cm/sec      MV mean PG 1.1 mmHg      MV V2 VTI 19.1 cm      MVA(VTI) 4.9 cm^2      Ao pk shanna 110.2 cm/sec      Ao max PG 4.9 mmHg      Ao max PG (full) 1.2 mmHg      Ao V2 mean 81.2 cm/sec      Ao mean PG 2.9  mmHg      Ao mean PG (full) 0.91 mmHg      Ao V2 VTI 18.2 cm      KARYN(I,A) 5.2 cm^2      KARYN(I,D) 5.2 cm^2      KARYN(V,A) 4.3 cm^2      KARYN(V,D) 4.3 cm^2      LV V1 max PG 3.6 mmHg      LV V1 mean PG 2.0 mmHg      LV V1 max 95.0 cm/sec      LV V1 mean 63.1 cm/sec      LV V1 VTI 18.7 cm      CO(LVOT) 8.6 l/min      CI(LVOT) 3.8 l/min/m^2      SV(LVOT) 94.0 ml      SI(LVOT) 42.0 ml/m^2      PA V2 max 97.0 cm/sec      PA max PG 3.8 mmHg      PA max PG (full) 1.8 mmHg      PA V2 mean 77.2 cm/sec      PA mean PG 2.5 mmHg      PA mean PG (full) 1.5 mmHg      PA V2 VTI 17.0 cm      PA acc time 0.13 sec      RV V1 max PG 2.0 mmHg      RV V1 mean PG 1.0 mmHg      RV V1 max 70.9 cm/sec      RV V1 mean 47.6 cm/sec      RV V1 VTI 12.0 cm      TR max shanna 210.5 cm/sec      RVSP(TR) 20.8 mmHg      RAP systole 3.0 mmHg      PA pr(Accel) 20.8 mmHg       CV ECHO FREDY - BZI_BMI 33.9 kilograms/m^2       CV ECHO FREDY - BSA(HAYCOCK) 2.3 m^2       CV ECHO FREDY - BZI_METRIC_WEIGHT 107.0 kg       CV ECHO FREDY - BZI_METRIC_HEIGHT 177.8 cm      LA dimension 4.0 cm      EF(MOD-bp) 60 %      LA dimension(2D) 4.0 cm      Echo EF Estimated 60 %     Narrative:      · Left ventricular wall thickness is consistent with mild concentric   hypertrophy.  · Estimated left ventricular EF = 60% Left ventricular systolic function   is normal.  · Estimated right ventricular systolic pressure from tricuspid   regurgitation is normal (<35 mmHg).       ECG 12 Lead [[id number]] Collected: 05/26/21 1142     Updated: 05/27/21 0745     QT Interval 417 ms     Narrative:      HEART RATE= 86  bpm  RR Interval= 696  ms  CO Interval=   ms  P Horizontal Axis=   deg  P Front Axis=   deg  QRSD Interval= 90  ms  QT Interval= 417  ms  QRS Axis= -6  deg  T Wave Axis= 1  deg  - ABNORMAL ECG -  Atrial fibrillation  Repol abnrm suggests ischemia, anterior leads  When compared with ECG of 25-May-2021 19:44:23,  No significant change  Electronically Signed By: Laurent,  Paola PALUMBO ([facility]) 27-May-2021 07:42:37  Date and Time of Study: 2021-05-26 11:42:27              Results for orders placed during the hospital encounter of 05/25/21    Adult Transthoracic Echo Complete w/ Color, Spectral and Contrast if Necessary Per Protocol    Interpretation Summary  · Left ventricular wall thickness is consistent with mild concentric hypertrophy.  · Estimated left ventricular EF = 60% Left ventricular systolic function is normal.  · Estimated right ventricular systolic pressure from tricuspid regurgitation is normal (<35 mmHg).      XR Bone Survey Complete    Result Date: 5/28/2021  1. Small nonspecific lucent lesions in C2 vertebral body measuring 6 mm and at the right humeral head measuring 5 mm. 2. No acute cardiopulmonary process. 4. Atherosclerotic calcifications with suspected 4.4 cm infrarenal aortic aneurysm, consider follow-up abdominal CT.  Electronically Signed By-Julien Reid MD On:5/28/2021 2:41 PM This report was finalized on [id number] by  Julien Reid MD.    CT Chest Without Contrast Diagnostic    Result Date: 5/26/2021   1. Abnormal appearance of the lungs which can be seen with an atypical infection such as Covid. 2. Trace layering pleural effusions. 3. Mildly enlarged mediastinal lymph nodes in the right paratracheal region. 4. Splenomegaly is suspected. 5. Bilateral gynecomastia.  Electronically Signed By-Laz Arce MD On:5/26/2021 8:14 PM This report was finalized on [id number] by  Laz Arce MD.    US Spleen    Result Date: 5/27/2021  Spleen size just at upper limits normal, 14.1 cm.  Electronically Signed By-Gloria Watson MD On:5/27/2021 3:05 PM This report was finalized on [id number] by  Gloria Watson MD.    XR Chest 1 View    Result Date: 5/26/2021  1. Vague patchy bilateral airspace opacities likely representing atypical infection.  Electronically Signed By-aVsyl Noriega MD On:5/26/2021 7:18 AM This report was finalized on [id number] by  Vasyl  MD Hari.          Xrays, labs reviewed personally by physician.    Medication Review:   I have reviewed the patient's current medication list      Scheduled Meds  aspirin, 81 mg, Oral, Daily  cefdinir, 300 mg, Oral, Q12H  famotidine, 20 mg, Oral, BID AC  folic acid, 500 mcg, Oral, Daily  gabapentin, 400 mg, Oral, TID  ipratropium-albuterol, 3 mL, Nebulization, 4x Daily - RT  Andreas, 1 packet, Oral, BID  predniSONE, 5 mg, Oral, Daily  sodium chloride, 10 mL, Intravenous, Q12H  thiamine, 100 mg, Oral, Daily        Meds Infusions  sodium chloride, 50 mL/hr, Last Rate: 50 mL/hr (05/27/21 1249)        Meds PRN  •  acetaminophen **OR** acetaminophen **OR** acetaminophen  •  HYDROcodone-acetaminophen  •  LORazepam **OR** LORazepam **OR** LORazepam **OR** LORazepam **OR** LORazepam **OR** LORazepam  •  melatonin  •  ondansetron **OR** ondansetron  •  sodium chloride        Assessment/Plan   Assessment/Plan     Active Hospital Problems    Diagnosis  POA   • **Neutropenia (CMS/[facility]) [D70.9]  Yes   • Pressure injury of left thigh, unstageable (CMS/[facility]) [L89.220]  Unknown   • Pneumonia [J18.9]  Yes   • Alcoholism (CMS/[facility]) [F10.20]  Yes   • Thrombocytopenic disorder (CMS/[facility]) [D69.6]  Yes   • Nicotine dependence [F17.200]  Yes   • 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       MEDICAL DECISION MAKING COMPLEXITY BY PROBLEM:     Pancytopenia: May be secondary to Felty's syndrome which causes neutropenia and splenomegaly in patients with rheumatoid arthritis.  Plaquenil use a contributing factor.  Methotrexate was stopped in January 2021 due to worsening of anemia and neutropenia.  On folic acid. In 3/2021, white blood cell count 2.3, hemoglobin 12.1,  platelets 126,000, ANC was 0.0. Platelets had acutely declined. S/p bone marrow biopsy in 2013.  Pancytopenia work-up thus far showed no LEENA, nutritional deficiencies or hemolysis. CMV/EBV IgM's negative. Coags normal.  Peripheral blood flow showed increase in T LGL's suspicious for T-cell leukemia which could also be contributing to the cytopenias.  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  All counts improving.  -Heme-onc following     CAP   -XR chest 1 view at [facility] shows patchy peripheral airspace opacities in the right lung field and bibasilar atelectasis and/or consolidation  -In the [facility] ED 1 g IV meropenem, 1000 mg IV vancomycin given  -CT chest shows bilateral pneumonia  -Blood cultures NGTD  -ID following,Omnicef 300 mg p.o. twice daily for 7 days  Continue supportive care  Okay to discharge from Infectious Disease standpoint  -Okay to discharge to rehab    New onset A. fib  Elevated BNP  -EKG showed A. fib, rate controlled  -BNP elevated however echo shows preserved EF with mild concentric LVH, no diastolic dysfunction noted  -Rate controlled, not a candidate for full anticoagulation at this time due to pancytopenia including thrombocytopenia  -Discussed with cardiology, recommending baby aspirin, started as okay with hematology  -Cardiology following, okay to discharge    MARTINEZ, improving  -Likely due to Lasix, stopped, on gentle fluid  -Creatinine improving  -Nephrology following    Alcohol abuse, stable  -Counseled on cessation  -Continue CIWA protocol with Ativan as needed  -Continue multivitamin, folate, thiamine  -Monitor     Felty syndrome/rheumatoid arthritis  -Continue home oral prednisone with gabapentin  -Continue supportive care  -PT/OT, recommending rehab placement, awaiting placement    DVT prophylaxis  -SCDs only for now    VTE Prophylaxis -   Mechanical Order History:      Ordered        05/25/21 6184  Place Sequential Compression Device  Once          21 1834  Maintain Sequential Compression Device  Continuous                 Pharmalogical Order History:     None            Code Status -   Code Status and Medical Interventions:   Ordered at: 21 1834     Level Of Support Discussed With:    Patient     Code Status:    CPR     Medical Interventions (Level of Support Prior to Arrest):    Full         Discharge Planning  Medically stable for discharge.  Awaiting rehab placement          Electronically signed by Uziel Rodriguez MD, 21, 12:38 EDT.  [facility] Hospitalist Team          Electronically signed by Uziel Rodriguez MD at 21 1411     Rex Shepherd MD at 21 1134          Hematology/Oncology Inpatient Progress Note    PATIENT NAME: Raz Valencia  : 1960  MRN: [id number]    CHIEF COMPLAINT: Shortness of breath     HISTORY OF PRESENT ILLNESS:    61 y.o. male presented to [facility] on 2021 as a transfer from [facility]  due to neutropenia and right lower lobe pneumonia.  Patient had initially presented to [facility] emergency department on 2021 with a one week history of fever, decreased appetite, and myalgias.  Patient was given IV fluids and antibiotics and was discharged home with instructions to follow-up with primary care physician due to low white blood count and renal failure.  Their emergency department contacted the patient on 2021 with instructions to return to the hospital due to a positive blood culture for gram-positive cocci.  His dyspnea had gotten worse over the prior one week.  The patient had additional complaints of fever, productive cough, chills, body aches, tachycardia, and palpitations. Labs in the [facility] emergency department were as follows: White blood count 1.2, hemoglobin 11.2, platelets 60,000, , sodium 135, potassium 3.9, chloride 106, CO2 20, glucose  101, BUN 33, creatinine 1.7, ALT 19, AST 34, alkaline phosphatase 82, total bili 1.3, calcium 9.3, lactic acid 1.8.  A chest x-ray showed patchy peripheral airspace opacities in the right lung field and bibasilar atelectasis and/or consolidation.  He received 1 g of IV meropenem, 1 L of normal saline,  and IV vancomycin at [facility]. Blood cultures were drawn on [facility] Infectious disease was consulted.  The patient was started on IV Rocephin and doxycycline.  A chest x-ray on 5/26/2021 revealed vague patchy bilateral airspace opacities likely representing atypical infection.      05/26/21  Hematology/Oncology was consulted by  TITUS Pedersen  for leukopenia. A review of [facility] showed the patient was previously seen for anemia and neutropenia on 03/23/2021 by Dr. Nick Espana, Oncologist, at [facility] for worsening pancytopenia.  Patient has a diagnosis of Felty's syndrome. He had been started on treatment for rheumatoid arthritis with methotrexate and was tolerating without difficulty.  In January of 2021, his methotrexate was stopped due to anemia and neutropenia. At that time, white blood cell count was 2.3, hemoglobin 12.1, platelets 126,000, ANC 0.0.  Comparatively CBC on 10/24/2019 showed white blood count 5.5, hemoglobin 12.5, platelets 125,000. He had a bone marrow biopsy in 2013 that showed minimally decreased cellularity with marked decrease in granulopoiesis, predominance of T lymphocytes with rare small non-paratrabecular B-cell lymphoid aggregate. No indication of acute leukemia, lymphoma, metastatic tumor or granulomatous infiltrate.  It was was initially thought that his worsening pancytopenia could have been related to methotrexate use. Recommendation was made to continue to hold methotrexate and repeat a CBC in May 2021.  If there was any persistent worsening of blood counts, then the plan was for potentially to repeat  a bone marrow biopsy.     Past Medical History: Felty's syndrome, leukopenia, neutropenic fever (05/25/2021), pneumonia (05/25/2021), alcohol abuse, tobacco abuse, peripheral vascular disease, iron deficiency anemia, and rheumatoid arthritis.  Surgical History:  has no past surgical history on file.  Social History: He resides in [place], [place].  He reports that he has been smoking.about 3 packs per day. He has never used smokeless tobacco. He reports current alcohol use of about 4 standard drinks of alcohol per week. He reports previous drug use.  Family History: Family history is not on file.  Allergies:No Known Allergies     PCP: Leisa Malik PA-C    INTERVAL HISTORY:  · 5/27/2021 -White blood count 1.3, hemoglobin 10.2, MCV 97.9, platelets 63,000, . Occult blood stool - negative, reticulocytes 1.1 (0.70-1.90), haptoglobin 231 (), iron 50 (), iron saturation 28 (20-50), transferrin 118 (200-360), TIBC 176 (298-536), ferritin 2555 (), folate 10.5 (4.78-24.20), vitamin B12 1910 (211-946), CMV IgM <30.0 (<30.0), EBV IgM <36 (<36).  PT 11.6 (9.6-11.7), INR 1.06 (0.93-1.10), PTT 29.5 (24.0-31.0). Peripheral blood smear showed reactive lymphocytes present. Records reviewed from [facility].  CT chest 5/26/2021- Abnormal appearance of the lungs which can be seen with an atypical infection such as Covid. Trace layering pleural effusions. Mildly enlarged mediastinal lymph nodes in the right paratracheal region. Splenomegaly is suspected.  Ultrasound of spleen showed size at upper limits of normal (14.1cm). Bilateral gynecomastia. 2D echocardiogram-5/25/2021-Left ventricular wall thickness is consistent with mild concentric hypertrophy, estimated left ventricular EF = 60%, left ventricular systolic function is normal, and estimated right ventricular systolic pressure from tricuspid regurgitation is normal.   · 5/28/2021-additional review of Care Everywhere  revealed [facility]IFE in 2019 showed 2 clones of IgG kappa paraproteins.  SPEP showed M spike x2 (0.23 and 0.21g/dL in gamma region).  SPEP this admission showed an M spike of 0.4 g/dL.  Flow cytometry showed markedly decreased neutrophils with an increase in T cells, consistent with T-LGL's.  White count 2.3, ANC 1.1, 4% atypical lymphocytes, hemoglobin 10, platelet count 77.  · 5/29/21-bone survey showed an infrarenal aortic aneurysm measuring 4.4 cm.  There was small nonspecific lucent lesions at C2 (6 mm), and right humeral head (5 mm).  WBC 2.8, ANC 1.1, hemoglobin 10.2, platelets 95.  Copper 159 ().  · 5/31/2021-WBC 2.3, hemoglobin 9.8 and a platelet count 96,000.    History of present illness reviewed since last visit and changes noted on 05/31/21.    Subjective     Feels all right.    ROS:  Review of Systems   Constitutional: Negative for activity change, chills, fatigue, fever and unexpected weight change.   HENT: Negative for congestion, dental problem, hearing loss, mouth sores, nosebleeds, sore throat and trouble swallowing.    Eyes: Negative for photophobia, pain and visual disturbance.   Respiratory: Negative for cough, chest tightness and shortness of breath.    Cardiovascular: Negative for chest pain, palpitations and leg swelling.   Gastrointestinal: Negative for abdominal distention, abdominal pain, blood in stool, constipation, diarrhea, nausea and vomiting.   Endocrine: Negative for cold intolerance and heat intolerance.   Genitourinary: Negative for decreased urine volume, difficulty urinating, dysuria, frequency, hematuria and urgency.   Musculoskeletal: Negative for arthralgias and gait problem.   Skin: Negative for rash and wound.   Neurological: Negative for dizziness, tremors, seizures, weakness, light-headedness, numbness and headaches.   Hematological: Negative for adenopathy. Does not bruise/bleed easily.   Psychiatric/Behavioral: Negative for confusion and hallucinations. The patient is  "not nervous/anxious.    All other systems reviewed and are negative.     MEDICATIONS:    Scheduled Meds:  aspirin, 81 mg, Oral, Daily  cefdinir, 300 mg, Oral, Q12H  famotidine, 20 mg, Oral, BID AC  folic acid, 500 mcg, Oral, Daily  gabapentin, 400 mg, Oral, TID  ipratropium-albuterol, 3 mL, Nebulization, 4x Daily - RT  Andreas, 1 packet, Oral, BID  predniSONE, 5 mg, Oral, Daily  sodium chloride, 10 mL, Intravenous, Q12H  thiamine, 100 mg, Oral, Daily       Continuous Infusions:  sodium chloride, 50 mL/hr, Last Rate: 50 mL/hr (05/27/21 1249)       PRN Meds:  •  acetaminophen **OR** acetaminophen **OR** acetaminophen  •  HYDROcodone-acetaminophen  •  LORazepam **OR** LORazepam **OR** LORazepam **OR** LORazepam **OR** LORazepam **OR** LORazepam  •  melatonin  •  ondansetron **OR** ondansetron  •  sodium chloride     ALLERGIES:  No Known Allergies    Objective    VITALS:   /58 (BP Location: Left arm, Patient Position: Lying)   Pulse 95   Temp 97.9 °F (36.6 °C) (Oral)   Resp 16   Ht 177.8 cm (70\")   Wt 107 kg (236 lb 12.4 oz)   SpO2 99%   BMI 33.97 kg/m²     PHYSICAL EXAM:  Physical Exam  Vitals and nursing note reviewed.   Constitutional:       General: He is not in acute distress.     Appearance: Normal appearance. He is well-developed. He is obese. He is not diaphoretic.   HENT:      Head: Normocephalic and atraumatic.      Right Ear: External ear normal.      Left Ear: External ear normal.      Nose: Nose normal. No rhinorrhea.      Mouth/Throat:      Mouth: Mucous membranes are moist.      Dentition: Abnormal dentition ( edentulous ).      Pharynx: Oropharynx is clear. No oropharyngeal exudate or posterior oropharyngeal erythema.      Comments: Edentulous  Eyes:      General: No scleral icterus.     Extraocular Movements: Extraocular movements intact.      Conjunctiva/sclera: Conjunctivae normal.      Pupils: Pupils are equal, round, and reactive to light.   Cardiovascular:      Rate and Rhythm: Normal " rate and regular rhythm.      Heart sounds: Normal heart sounds. No murmur heard.        Comments: Cardiac monitor leads.   Pulmonary:      Effort: Pulmonary effort is normal. No respiratory distress.      Breath sounds: Normal breath sounds. No stridor. No wheezing or rales.   Abdominal:      General: Bowel sounds are normal. There is no distension.      Palpations: Abdomen is soft. There is no mass.      Tenderness: There is no abdominal tenderness. There is no guarding or rebound.      Comments: Obese abdomen   Genitourinary:     Comments: Deferred   Musculoskeletal:         General: No swelling, tenderness or deformity. Normal range of motion.      Cervical back: Normal range of motion and neck supple.      Right lower leg: No edema.      Left lower leg: No edema.      Comments: Left upper extremity peripheral IV   Lymphadenopathy:      Cervical: No cervical adenopathy.      Upper Body:      Right upper body: No supraclavicular adenopathy.      Left upper body: No supraclavicular adenopathy.   Skin:     General: Skin is warm and dry.      Coloration: Skin is not pale.      Findings: Bruising and ecchymosis ( BUE) present. No erythema or rash.      Comments: Stasis changes to bilateral lower extremities.   Neurological:      General: No focal deficit present.      Mental Status: He is alert and oriented to person, place, and time.      Coordination: Coordination normal.   Psychiatric:         Mood and Affect: Mood normal.         Behavior: Behavior normal.         Thought Content: Thought content normal.         Judgment: Judgment normal.         RECENT LABS:  Lab Results (last 24 hours)     Procedure Component Value Units Date/Time    Basic Metabolic Panel [[id number]]  (Abnormal) Collected: 05/31/21 0307    Specimen: Blood Updated: 05/31/21 0349     Glucose 96 mg/dL      BUN 29 mg/dL      Creatinine 1.18 mg/dL      Sodium 137 mmol/L      Potassium 3.7 mmol/L      Chloride 103 mmol/L      CO2 24.0 mmol/L       Calcium 8.8 mg/dL      eGFR Non African Amer 62 mL/min/1.73      BUN/Creatinine Ratio 24.6     Anion Gap 10.0 mmol/L     Narrative:      GFR Normal >60  Chronic Kidney Disease <60  Kidney Failure <15      CBC & Differential [[id number]]  (Abnormal) Collected: 05/31/21 0307    Specimen: Blood Updated: 05/31/21 0331    Narrative:      The following orders were created for panel order CBC & Differential.  Procedure                               Abnormality         Status                     ---------                               -----------         ------                     CBC Auto Differential[[id number]]        Abnormal            Final result                 Please view results for these tests on the individual orders.    CBC Auto Differential [[id number]]  (Abnormal) Collected: 05/31/21 0307    Specimen: Blood Updated: 05/31/21 0331     WBC 2.30 10*3/mm3      RBC 2.90 10*6/mm3      Hemoglobin 9.8 g/dL      Hematocrit 28.3 %      MCV 97.6 fL      MCH 33.8 pg      MCHC 34.6 g/dL      RDW 15.2 %      RDW-SD 52.1 fl      MPV 9.1 fL      Platelets 96 10*3/mm3      Neutrophil % 32.3 %      Lymphocyte % 52.9 %      Monocyte % 5.3 %      Eosinophil % 8.6 %      Basophil % 0.9 %      Neutrophils, Absolute 0.70 10*3/mm3      Lymphocytes, Absolute 1.20 10*3/mm3      Monocytes, Absolute 0.10 10*3/mm3      Eosinophils, Absolute 0.20 10*3/mm3      Basophils, Absolute 0.00 10*3/mm3      nRBC 0.3 /100 WBC     Blood Culture - Blood, Arm, Right [[id number]] Collected: 05/1960    Specimen: Blood from Arm, Right Updated: 05/30/21 2045     Blood Culture No growth at 5 days    Blood Culture - Blood, Hand, Right [[id number]] Collected: 05/1960    Specimen: Blood from Hand, Right Updated: 05/30/21 2045     Blood Culture No growth at 5 days        PENDING RESULTS: T-cell gene rearrangement and cytogenetics on peripheral blood, SIFE,  immunoglobulins, free light chain ratio, UIFE, UPEP.    IMAGING REVIEWED:  No radiology results  for the last day  I have reviewed the patient's labs, imaging, reports, and other clinician documentation.    Assessment/Plan   ASSESSMENT  9. 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  Plaquenil use a contributing factor.  Methotrexate was stopped in January 2021 due to worsening of anemia and neutropenia.  On folic acid. In 3/2021, white blood cell count 2.3, hemoglobin 12.1, platelets 126,000, ANC was 0.0. Platelets had acutely declined. S/p bone marrow biopsy in 2013.  Pancytopenia work-up thus far showed no LEENA, nutritional deficiencies or hemolysis. CMV/EBV IgM's negative. Coags normal.  Peripheral blood flow showed increase in T LGL's suspicious for T-cell leukemia which could also be contributing to the cytopenias.  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  All counts improving.  10. IgG kappa MGUS-diagnosed 2019.  Urine studies, SIFE, light chain ratio and immunoglobulins pending.  Bone survey showed 2 small nonspecific lucent lesions- one in cervical spine and one in right humerus.  Calcium normal and creatinine mildly elevated.    11. Mediastinal Lymphadenopathy: seen on CT Chest 5/26/2021. Spleen upper limits of normal.   12. Pneumonia: CXR and chest CT both showed atypical infection. On cefdinir. Infectious disease consulted.  Patient was reported with positive blood culture 5/22/2021 for gram positive cocci at outlying facility but those results can not be found-blood cultures here-no growth to date.  Afebrile.  13. Rheumatoid arthritis: Off methotrexate.  On oral prednisone and Plaquenil on hold due to cytopenias.  Started Pepcid.  On gabapentin and Norco for pain.      14. Atrial fibrillation with RVR/elevated BNP: Cardiology consulted. On ASA 81 mg. 2D echocardiogram showed LVEF of 60%.  15. Acute kidney injury: Appears to be secondary to diuretics, Lasix has been  "d/c'ed. Nephrology consulted.  16. Alcohol abuse/Tobacco abuse/infrarenal aortic aneurysm:  Management per Primary team.      PLAN  7. Follow CBC.  8. Await T-cell gene rearrangement and cytogenetics on peripheral blood.   9. Continue to hold Plaquenil.  10. Follow-up chest CT as outpatient.  11. SIFE, Free light chain ratio, immunoglobulins, UIFE and UPEP all pending.   12. We will schedule outpatient follow-up here or in [address].      I discussed the patients findings and my recommendations with patient.    Electronically signed by Rex Shepherd MD, 05/31/21, 11:42 AM EDT.            Electronically signed by Rex Shepherd MD at 05/31/21 1142     Edi Elliott MD at 05/31/21 1043             LOS: 6 days    Patient Care Team:  Leisa Malik PA-C as PCP - General (Physician Assistant)  Rex Shepherd MD as Consulting Physician (Hematology and Oncology)      Subjective     Patient feeling better this morning.  Denies any chest pain, shortness of breath, nausea, vomiting, diarrhea    Objective     Vital Sign Min/Max for last 24 hours  Temp:  [97.5 °F (36.4 °C)-97.9 °F (36.6 °C)] 97.9 °F (36.6 °C)  Heart Rate:  [66-95] 95  Resp:  [16-18] 16  BP: (100-118)/(58-70) 100/58                       Flowsheet Rows      First Filed Value   Admission Height  177.8 cm (70\") Documented at 05/26/2021 0027   Admission Weight  108 kg (238 lb 12.1 oz) Documented at 05/25/2021 1928          I/O this shift:  In: 240 [P.O.:240]  Out: -   I/O last 3 completed shifts:  In: 2760 [P.O.:2760]  Out: 4700 [Urine:4700]    Physical Exam:  Physical Exam    General.    Awake, NAD   head.  Atraumatic, normocephalic  Neck.  Supple.  No JVD  ENT.  Oral mucosa dry  Respiratory.  Decreased breath in the bases.   Cardiovascular.  Regular rhythm.  S1, S2  Abdominal.  Soft, nontender.  Bowel sounds present  Extremities.  Trace edema     LABS:  Lab Results   Component Value Date    CALCIUM 8.8 05/31/2021    PHOS 3.5 05/29/2021     Results from " last 7 days   Lab Units 05/31/21  0307 05/30/21  0720 05/29/21  0423 05/28/21  0407 05/27/21  0215 05/26/21  0810 05/25/21  1958   MAGNESIUM mg/dL  --   --   --  1.7  --   --  2.0   SODIUM mmol/L 137 133* 138 138 139  --  135*   POTASSIUM mmol/L 3.7 4.1 4.1 3.9 3.9  --  4.1   CHLORIDE mmol/L 103 101 105 105 108*  --  105   CO2 mmol/L 24.0 22.0 23.0 23.0 17.0*  --  16.0*   BUN mg/dL 29* 38* 44* 51* 44*  --  29*   CREATININE mg/dL 1.18 1.25 1.36* 1.33* 1.77*  --  1.26   GLUCOSE mg/dL 96 94 98 118* 146*  --  81   CALCIUM mg/dL 8.8 8.9 8.9 8.6 8.5*  --  8.2*   WBC 10*3/mm3 2.30* 2.40* 2.80* 2.30* 1.30*   < >  --    HEMOGLOBIN g/dL 9.8* 10.4* 10.2* 10.0* 10.2*   < >  --    PLATELETS 10*3/mm3 96* 96* 95* 77* 63*   < >  --    ALT (SGPT) U/L  --   --   --   --  26  --  12   AST (SGOT) U/L  --   --   --   --  44*  --  20    < > = values in this interval not displayed.     Lab Results   Component Value Date    TROPONINT <0.010 05/26/2021     Estimated Creatinine Clearance: 78.5 mL/min (by C-G formula based on SCr of 1.18 mg/dL).      Brief Urine Lab Results  (Last result in the past 365 days)      Color   Clarity   Blood   Leuk Est   Nitrite   Protein   CREAT   Urine HCG        05/25/21 2106 Dark Yellow Clear Negative Negative Negative 30 mg/dL (1+)             WEIGHTS:     Wt Readings from Last 1 Encounters:   05/30/21 0404 107 kg (236 lb 12.4 oz)   05/29/21 0443 107 kg (236 lb 12.7 oz)   05/28/21 0250 107 kg (236 lb 12.4 oz)   05/27/21 0251 109 kg (240 lb 11.9 oz)   05/26/21 0248 107 kg (236 lb 3.2 oz)   05/25/21 1928 108 kg (238 lb 12.1 oz)       aspirin, 81 mg, Oral, Daily  cefdinir, 300 mg, Oral, Q12H  famotidine, 20 mg, Oral, BID AC  folic acid, 500 mcg, Oral, Daily  gabapentin, 400 mg, Oral, TID  ipratropium-albuterol, 3 mL, Nebulization, 4x Daily - RT  Andreas, 1 packet, Oral, BID  predniSONE, 5 mg, Oral, Daily  sodium chloride, 10 mL, Intravenous, Q12H  thiamine, 100 mg, Oral, Daily      sodium chloride, 50 mL/hr, Last  Rate: 50 mL/hr (21 1249)        Assessment/Plan       1.  Acute kidney injury  Secondary to diuretics  Renal function improving.  Off diuretics  Electrolytes, volume status okay  Continue gentle IV hydration     2.  Right lower lobe pneumonia  Patient on IV antibiotics.  Infectious disease following     3.  Metabolic acidosis  Improved.     4.  Pancytopenia/Felty syndrome  Hematology following    I will sign off.  Please call for any question    Edi Elliott MD  21  10:43 EDT    Electronically signed by Edi Elliott MD at 21 1045          Physical Therapy Notes (most recent note)      Shelly Sanchez PTA at 21 1255  Version 1 of 1        Subjective: Pt agreeable to therapeutic plan of care. Patient stated his arms were worse today due to RA but seemed to feel a little better by end of session    Objective:     Bed mobility - Mod-A  Transfers - Min-A and with rolling walker with raised bed due to limited UE use  Ambulation - 100feet CGA and with rolling walker    Pain: didn't rate but c/o BUE pain from RA    Education: Provided education on importance of mobility and skilled verbal / tactile cueing throughout intervention.     Assessment: Raz Valencia presents with functional mobility impairments which indicate the need for skilled intervention. Tolerating session today without incident.Patient seemed more rigid in all mobility, presented with slowed jewel, decreased stride and lat sway. Will continue to follow and progress as tolerated.     Plan/Recommendations:   Pt would benefit from Inpatient Rehabilitation placement at discharge from facility and requires no DME at discharge.   Pt desires Inpatient Rehabilitation placement at discharge. Pt cooperative; agreeable to therapeutic recommendations and plan of care.     Basic Mobility 6-click:  Rollin = Total, A lot = 2, A little = 3; 4 = None  Supine>Sit:   1 = Total, A lot = 2, A little = 3; 4 = None   Sit>Stand with arms:  1  = Total, A lot = 2, A little = 3; 4 = None  Bed>Chair:   1 = Total, A lot = 2, A little = 3; 4 = None  Ambulate in room:  1 = Total, A lot = 2, A little = 3; 4 = None  3-5 Steps with railin = Total, A lot = 2, A little = 3; 4 = None  Score: 16    Post-Tx Position: Supine with HOB Elevated, Alarms activated and Call light and personal items within reach  PPE: gloves, surgical mask, eyewear protection          Electronically signed by Shelly Sanchez PTA at 21 1632          Occupational Therapy Notes (most recent note)      Shelley Scanlon OT at 21 1202        Goal Outcome Evaluation:         Assessment: Raz Valencia presents with ADL impairments below baseline abilities which indicate the need for continued skilled intervention while inpatient.  He becomes fatigued quickly and requires assist for ADLs.   Tolerating session today without incident. Will continue to follow and progress as tolerated.     Plan/Recommendations:   Pt would benefit from Inpatient Rehabilitation placement at discharge from facility.   Pt desires Inpatient Rehabilitation placement at discharge. Pt cooperative; agreeable to therapeutic recommendations and plan of care.     Electronically signed by Shelley Scanlon OT at 21 2517

## 2021-06-01 NOTE — DISCHARGE PLACEMENT REQUEST
"Wound care orders and d/c summary.     Mayuri PALUMBO RN  PH# [phone]    Raz Pyle (61 y.o. Male)     Date of Birth Social Security Number Address Home Phone MRN    1960  [facility]  [place] [address] [phone] [id number]    Tenriism Marital Status          None Significant Other       Admission Date Admission Type Admitting Provider Attending Provider Department, Room/Bed    21 Urgent Lynette Silva DO Gad, George Fayez Labib Youssief, MD [facility] 3A MEDICAL INPATIENT, 303/1    Discharge Date Discharge Disposition Discharge Destination         Skilled Nursing Facility (DC - External)              Attending Provider: Uziel Rodriguez MD    Allergies: No Known Allergies    Isolation: None   Infection: None   Code Status: CPR    Ht: 177.8 cm (70\")   Wt: 112 kg (245 lb 13 oz)    Admission Cmt: None   Principal Problem: Neutropenia (CMS/HCC) [D70.9]                 Active Insurance as of 2021     Primary Coverage     Payor Plan Insurance Group Employer/Plan Group    HUMANA MEDICARE REPLACEMENT HUMANA MEDICARE REPLACEMENT [id number]     Payor Plan Address Payor Plan Phone Number Payor Plan Fax Number Effective Dates    [address] [phone]  2020 - None Entered    [facility] [id number]       Subscriber Name Subscriber Birth Date Member ID       RAZ PYLE 1960 [id number]                 Emergency Contacts      (Rel.) Home Phone Work Phone Mobile Phone    YAQUELIN REYES (Significant Other) [phone] -- [phone]            Expand AllCollapse All      Show:Clear all  [x]Manual[x]Template[]Copied    Added by:  [x]Aniya Black APRN    []Ashvin for details  Wound Initial Evaluation  [facility]     Patient Name: Raz Pyle                  : 1960                      MRN: [id number]  Today's Date: 2021       Room Number: 303/1        Admit Date: 2021  Attending: Lynette Silva, " "DO     Consult Requested By: Dr Silva     Reason For Consult: Pressure injury left post thigh     Chief Complaint: 61-year-old male presented to the hospital as a transfer from [facility] due to neutropenia and right lower lobe pneumonia.  Consult received for a posterior thigh pressure injury.  Patient is somewhat of a poor historian states that he has been feeling weaker over the last week and has been mostly in his chair at home he was unaware of having any wounds or injuries however he said that he felt like he had some \"chafing \"and attempted to apply medication to it however he does not remember what happened after that.  He is occasionally incontinent     Visit Dx:  No diagnosis found.      Patient Active Problem List   Diagnosis   • Pneumonia   • Alcoholism (CMS/[facility])   • Atrial fibrillation (CMS/HCC)   • Disorder of vitamin B12   • Felty's syndrome (CMS/HCC)   • Neutropenia (CMS/HCC)   • Nicotine dependence   • Thrombocytopenic disorder (CMS/HCC)   • Peripheral nerve disease   • Rheumatoid arthritis (CMS/HCC)   • MARTINEZ (acute kidney injury) (CMS/[facility])   • Bilateral lower extremity edema   • Pressure injury of left thigh, unstageable (CMS/HCC)         History:   Medical History        Past Medical History:   Diagnosis Date   • Neutropenic fever (CMS/HCC) 05/25/2021   • Pneumonia 05/25/2021   • Rheumatoid arthritis (CMS/[facility])           Surgical History   History reviewed. No pertinent surgical history.     Social History   Social History            Socioeconomic History   • Marital status: Significant Other       Spouse name: Not on file   • Number of children: Not on file   • Years of education: Not on file   • Highest education level: Not on file   Tobacco Use   • Smoking status: Heavy Tobacco Smoker       Packs/day: 3.00   • Smokeless tobacco: Never Used   Vaping Use   • Vaping Use: Never used   Substance and Sexual Activity   • Alcohol use: Yes       Alcohol/week: 4.0 standard drinks       Types: 4 " Cans of beer per week   • Drug use: Not Currently            Allergies:  No Known Allergies     Medications:     Current Facility-Administered Medications:   •  acetaminophen (TYLENOL) tablet 650 mg, 650 mg, Oral, Q4H PRN **OR** acetaminophen (TYLENOL) 160 MG/5ML solution 650 mg, 650 mg, Oral, Q4H PRN **OR** acetaminophen (TYLENOL) suppository 650 mg, 650 mg, Rectal, Q4H PRN, Lizet Coats APRN  •  cefTRIAXone (ROCEPHIN) 1 g in sodium chloride 0.9 % 100 mL IVPB, 1 g, Intravenous, Q24H, Eleonora Quinones APRN, Stopped at 05/25/21 2159  •  doxycycline (VIBRAMYCIN) 100 mg in sodium chloride 0.9 % 100 mL IVPB, 100 mg, Intravenous, Q12H, Eleonora Quinones APRN, Last Rate: 0 mL/hr at 05/25/21 2305, 100 mg at 05/26/21 0921  •  folic acid (FOLVITE) tablet 500 mcg, 500 mcg, Oral, Daily, Eleonora Quinones APRN, 500 mcg at 05/26/21 0921  •  furosemide (LASIX) injection 40 mg, 40 mg, Intravenous, BID, Lynette Silva,   •  gabapentin (NEURONTIN) capsule 400 mg, 400 mg, Oral, TID, Eleonora Quinones APRN, 400 mg at 05/26/21 0922  •  HYDROcodone-acetaminophen (NORCO) 7.5-325 MG per tablet 1 tablet, 1 tablet, Oral, Q8H PRN, Eleonora Quinones APRN  •  ipratropium-albuterol (DUO-NEB) nebulizer solution 3 mL, 3 mL, Nebulization, 4x Daily - RT, Eleonora Quinones APRN, 3 mL at 05/26/21 1144  •  LORazepam (ATIVAN) tablet 1 mg, 1 mg, Oral, Q2H PRN **OR** LORazepam (ATIVAN) injection 1 mg, 1 mg, Intravenous, Q2H PRN **OR** LORazepam (ATIVAN) tablet 2 mg, 2 mg, Oral, Q1H PRN **OR** LORazepam (ATIVAN) injection 2 mg, 2 mg, Intravenous, Q1H PRN **OR** LORazepam (ATIVAN) injection 2 mg, 2 mg, Intravenous, Q15 Min PRN **OR** LORazepam (ATIVAN) injection 2 mg, 2 mg, Intramuscular, Q15 Min PRN, Eleonora Quinones, TITUS  •  melatonin tablet 5 mg, 5 mg, Oral, Nightly PRN, Lizet Coats, TITUS  •  ondansetron (ZOFRAN) tablet 4 mg, 4 mg, Oral, Q6H PRN **OR** ondansetron (ZOFRAN) injection 4 mg, 4 mg, Intravenous, Q6H PRN, Lizet Coats, TITUS  •   predniSONE (DELTASONE) tablet 5 mg, 5 mg, Oral, Daily, Silva, Neelamram, DO, 5 mg at 05/26/21 0921  •  sodium chloride 0.9 % flush 10 mL, 10 mL, Intravenous, Q12H, Achterberg, Lizet, APRN, 10 mL at 05/26/21 1007  •  sodium chloride 0.9 % flush 10 mL, 10 mL, Intravenous, PRN, Achterberg, Lizet, APRN  •  thiamine (VITAMIN B-1) tablet 100 mg, 100 mg, Oral, Daily, Eleonora Quinones, APRN, 100 mg at 05/26/21 0921     Results Review:           Lab Results (last 48 hours)      Procedure Component Value Units Date/Time     BNP [[id number]]  (Abnormal) Collected: 05/26/21 0810     Specimen: Blood Updated: 05/26/21 1153       proBNP 7,774.0 pg/mL       Narrative:       Among patients with dyspnea, NT-proBNP is highly sensitive for the detection of acute congestive heart failure. In addition NT-proBNP of <300 pg/ml effectively rules out acute congestive heart failure with 99% negative predictive value.     Results may be falsely decreased if patient taking Biotin.        Manual Differential [[id number]]  (Abnormal) Collected: 05/26/21 1037     Specimen: Blood Updated: 05/26/21 1147       Neutrophil % 19.0 %         Lymphocyte % 70.0 %         Monocyte % 2.0 %         Basophil % 2.0 %         Bands %  2.0 %         Metamyelocyte % 3.0 %         Myelocyte % 1.0 %         Atypical Lymphocyte % 1.0 %         Neutrophils Absolute 0.13 10*3/mm3         Lymphocytes Absolute 0.43 10*3/mm3         Monocytes Absolute 0.01 10*3/mm3         Basophils Absolute 0.01 10*3/mm3         Anisocytosis Slight/1+       Macrocytes Slight/1+       WBC Morphology Normal       Platelet Estimate Decreased       Large Platelets Slight/1+     CBC & Differential [[id number]]  (Abnormal) Collected: 05/26/21 1037     Specimen: Blood Updated: 05/26/21 1147     Narrative:       The following orders were created for panel order CBC & Differential.  Procedure                               Abnormality         Status                     ---------                                -----------         ------                     Scan Slide[[id number]]                                       Final result               CBC Auto Differential[[id number]]        Abnormal            Final result                  Please view results for these tests on the individual orders.     Scan Slide [[id number]] Collected: 05/26/21 1037     Specimen: Blood Updated: 05/26/21 1147       Scan Slide --       Comment: See Manual Differential Results        CBC Auto Differential [[id number]]  (Abnormal) Collected: 05/26/21 1037     Specimen: Blood Updated: 05/26/21 1147       WBC 0.60 10*3/mm3         RBC 3.12 10*6/mm3         Hemoglobin 10.4 g/dL         Hematocrit 30.4 %         MCV 97.5 fL         MCH 33.4 pg         MCHC 34.3 g/dL         RDW 15.9 %         RDW-SD 54.3 fl         MPV 9.2 fL         Platelets 63 10*3/mm3       Narrative:       The previously reported component NRBC is no longer being reported. Previous result was 0.7 /100 WBC (Reference Range: 0.0-0.2 /100 WBC) on 5/26/2021 at 1105 EDT.     aPTT [[id number]]  (Normal) Collected: 05/26/21 1037     Specimen: Blood Updated: 05/26/21 1109       PTT 29.5 seconds       Protime-INR [[id number]]  (Normal) Collected: 05/26/21 1037     Specimen: Blood Updated: 05/26/21 1109       Protime 11.6 Seconds         INR 1.06     Ferritin [[id number]]  (Abnormal) Collected: 05/26/21 0810     Specimen: Blood Updated: 05/26/21 1102       Ferritin 2,555.00 ng/mL       Narrative:       Results may be falsely decreased if patient taking Biotin.        Flow Cytometry, Blood [[id number]] Collected: 05/26/21 1037     Specimen: Blood Updated: 05/26/21 1052     Haptoglobin [[id number]] Collected: 05/26/21 1037     Specimen: Blood Updated: 05/26/21 1052     Vitamin B12 [[id number]] Collected: 05/26/21 1037     Specimen: Blood Updated: 05/26/21 1052     Folate [[id number]] Collected: 05/26/21 1037     Specimen: Blood Updated: 05/26/21 1052     Protein Elec + Interp, Serum  [[id number]] Collected: 05/26/21 1037     Specimen: Blood Updated: 05/26/21 1052     Copper, Serum [[id number]] Collected: 05/26/21 1037     Specimen: Blood Updated: 05/26/21 1052     Cytomegalovirus Antibody, IgM [[id number]] Collected: 05/26/21 1037     Specimen: Blood Updated: 05/26/21 1052     Laly-Barr Virus VCA, IgM [[id number]] Collected: 05/26/21 1037     Specimen: Blood Updated: 05/26/21 1052     T-Cell Gene Rearrangement, PCR [[id number]] Collected: 05/26/21 1037     Specimen: Blood Updated: 05/26/21 1051     Iron Profile [[id number]]  (Abnormal) Collected: 05/26/21 0810     Specimen: Blood Updated: 05/26/21 1031       Iron 50 mcg/dL         Iron Saturation 28 %         Transferrin 118 mg/dL         TIBC 176 mcg/dL       Reticulocytes [[id number]]  (Normal) Collected: 05/26/21 0810     Specimen: Blood Updated: 05/26/21 0923       Reticulocyte % 1.11 %         Reticulocyte Absolute 0.0350 10*6/mm3       Basic Metabolic Panel [[id number]]  (Abnormal) Collected: 05/26/21 0810     Specimen: Blood Updated: 05/26/21 0906       Glucose 134 mg/dL         BUN 31 mg/dL         Creatinine 1.23 mg/dL         Sodium 135 mmol/L         Potassium 4.1 mmol/L         Chloride 102 mmol/L         CO2 21.0 mmol/L         Calcium 8.5 mg/dL         eGFR Non African Amer 59 mL/min/1.73         BUN/Creatinine Ratio 25.2       Anion Gap 12.0 mmol/L       Narrative:       GFR Normal >60  Chronic Kidney Disease <60  Kidney Failure <15        Troponin [[id number]]  (Normal) Collected: 05/26/21 0810     Specimen: Blood Updated: 05/26/21 0906       Troponin T <0.010 ng/mL       Narrative:       Troponin T Reference Range:  <= 0.03 ng/mL-   Negative for AMI  >0.03 ng/mL-     Abnormal for myocardial necrosis.  Clinicians would have to utilize clinical acumen, EKG, Troponin and serial changes to determine if it is an Acute Myocardial Infarction or myocardial injury due to an underlying chronic condition.         Results may be falsely decreased  if patient taking Biotin.        Sodium, Urine, Random - Urine, Clean Catch [[id number]] Collected: 05/25/21 2106     Specimen: Urine, Clean Catch Updated: 05/26/21 0055       Sodium, Urine 65 mmol/L       Narrative:       Reference intervals for random urine have not been established.  Clinical usage is dependent upon physician's interpretation in combination with other laboratory tests.         Urinalysis, Microscopic Only - Urine, Clean Catch [[id number]]  (Abnormal) Collected: 05/25/21 2106     Specimen: Urine, Clean Catch Updated: 05/26/21 0052       RBC, UA 6-12 /HPF         WBC, UA 0-2 /HPF         Bacteria, UA None Seen /HPF         Squamous Epithelial Cells, UA 0-2 /HPF         Hyaline Casts, UA 0-2 /LPF         Methodology Automated Microscopy     Urinalysis With Culture If Indicated - Urine, Clean Catch [[id number]]  (Abnormal) Collected: 05/25/21 2106     Specimen: Urine, Clean Catch Updated: 05/26/21 0052       Color, UA Dark Yellow       Appearance, UA Clear       pH, UA 6.0       Specific Gravity, UA 1.021       Glucose, UA Negative       Ketones, UA Trace       Bilirubin, UA Negative       Blood, UA Negative       Protein, UA 30 mg/dL (1+)       Leuk Esterase, UA Negative       Nitrite, UA Negative       Urobilinogen, UA 1.0 E.U./dL     Ammonia [[id number]]  (Abnormal) Collected: 05/25/21 2203     Specimen: Blood Updated: 05/25/21 2313       Ammonia <10 umol/L       Lactic Acid, Plasma [[id number]]  (Normal) Collected: 05/25/21 2203     Specimen: Blood Updated: 05/25/21 2251       Lactate 1.5 mmol/L       Respiratory Panel PCR w/COVID-19(SARS-CoV-2) JENNIE/MADALYN/MARY/PAD/COR/MAD/ARIANA In-House, NP Swab in UTM/VTM, 3-4 HR TAT - Swab, Nasopharynx [[id number]]  (Normal) Collected: 05/25/21 2032     Specimen: Swab from Nasopharynx Updated: 05/25/21 2156       ADENOVIRUS, PCR Not Detected       Coronavirus 229E Not Detected       Coronavirus HKU1 Not Detected       Coronavirus NL63 Not Detected       Coronavirus OC43  Not Detected       COVID19 Not Detected       Human Metapneumovirus Not Detected       Human Rhinovirus/Enterovirus Not Detected       Influenza A PCR Not Detected       Influenza B PCR Not Detected       Parainfluenza Virus 1 Not Detected       Parainfluenza Virus 2 Not Detected       Parainfluenza Virus 3 Not Detected       Parainfluenza Virus 4 Not Detected       RSV, PCR Not Detected       Bordetella pertussis pcr Not Detected       Bordetella parapertussis PCR Not Detected       Chlamydophila pneumoniae PCR Not Detected       Mycoplasma pneumo by PCR Not Detected     Narrative:       In the setting of a positive respiratory panel with a viral infection PLUS a negative procalcitonin without other underlying concern for bacterial infection, consider observing off antibiotics or discontinuation of antibiotics and continue supportive care. If the respiratory panel is positive for atypical bacterial infection (Bordetella pertussis, Chlamydophila pneumoniae, or Mycoplasma pneumoniae), consider antibiotic de-escalation to target atypical bacterial infection.     S. Pneumo Ag Urine or CSF - Urine, Urine, Clean Catch [[id number]]  (Normal) Collected: 05/25/21 2106     Specimen: Urine, Clean Catch Updated: 05/25/21 2129       Strep Pneumo Ag Negative     Legionella Antigen, Urine - Urine, Urine, Clean Catch [[id number]]  (Normal) Collected: 05/25/21 2106     Specimen: Urine, Clean Catch Updated: 05/25/21 2129       LEGIONELLA ANTIGEN, URINE Negative     BNP [[id number]]  (Abnormal) Collected: 05/1960     Specimen: Blood Updated: 05/25/21 2128       proBNP 7,361.0 pg/mL       Narrative:       Among patients with dyspnea, NT-proBNP is highly sensitive for the detection of acute congestive heart failure. In addition NT-proBNP of <300 pg/ml effectively rules out acute congestive heart failure with 99% negative predictive value.     Results may be falsely decreased if patient taking Biotin.        Comprehensive Metabolic  Panel [[id number]]  (Abnormal) Collected: 05/1960     Specimen: Blood Updated: 05/25/21 2111       Glucose 81 mg/dL         BUN 29 mg/dL         Creatinine 1.26 mg/dL         Sodium 135 mmol/L         Potassium 4.1 mmol/L         Chloride 105 mmol/L         CO2 16.0 mmol/L         Calcium 8.2 mg/dL         Total Protein 6.7 g/dL         Albumin 2.40 g/dL         ALT (SGPT) 12 U/L         AST (SGOT) 20 U/L         Alkaline Phosphatase 67 U/L         Total Bilirubin 0.8 mg/dL         eGFR Non African Amer 58 mL/min/1.73         Globulin 4.3 gm/dL         A/G Ratio 0.6 g/dL         BUN/Creatinine Ratio 23.0       Anion Gap 14.0 mmol/L       Narrative:       GFR Normal >60  Chronic Kidney Disease <60  Kidney Failure <15        Magnesium [[id number]]  (Normal) Collected: 05/1960     Specimen: Blood Updated: 05/25/21 2111       Magnesium 2.0 mg/dL       Blood Culture - Blood, Hand, Right [[id number]] Collected: 05/1960     Specimen: Blood from Hand, Right Updated: 05/25/21 2040     Blood Culture - Blood, Arm, Right [[id number]] Collected: 05/1960     Specimen: Blood from Arm, Right Updated: 05/25/21 2040                   Imaging Results (Last 72 Hours)      Procedure Component Value Units Date/Time     XR Chest 1 View [[id number]] Collected: 05/26/21 0717       Updated: 05/26/21 0720     Narrative:       EXAMINATION: XR CHEST 1 VW-     DATE OF EXAM: 5/25/2021 10:31 PM     INDICATION: Pneumonia.  Neutropenia      COMPARISON: None available     TECHNIQUE: Portable AP view of the chest was obtained.     FINDINGS:  The cardiomediastinal silhouette is within normal limits. There is  aortic arch atherosclerotic calcification. There is calcified AP window  lymph node likely related to chronic granulomatous disease. There are  vague bilateral patchy airspace opacities likely representing atypical  infection. There is no significant pleural effusion. There is no  evidence of pneumothorax. There are degenerative  changes of the thoracic  spine.        Impression:       1. Vague patchy bilateral airspace opacities likely representing  atypical infection.     Electronically Signed By-Vasyl Noriega MD On:5/26/2021 7:18 AM  This report was finalized on [id number] by  Vasyl Noriega MD.             Review of Systems:  Review of Systems     Physical Assessment:       Wound 05/26/21 0100 medial perineum (Active)                                                                                                                                                                                                                              Left posterior thigh unstageable pressure injury: There is a full-thickness ulceration to the left posterior thigh the wound appears to be evolving.  The edges of the wound are ecchymotic however the center of the wound does have soft brown eschar noted.  There is no odor no induration noted to the wound bed.  Really a small amount of serosanguineous exudate is noted at this time.  Approximate size of the wound is 4 x 3 cm.  The wound itself is not directly over a bony prominence this is suspicious that the patient perhaps sat on something and simply was not aware.  Patient does have a history of pressure injuries as there are multiple areas scarred to the sacrum and buttocks.     Final diagnosis  Unstageable left posterior thigh pressure injury        Recommendation and Plan  Unstageable pressure injury to the left posterior thigh would suspect that the injury is caused from the patient sitting on something as it is not over a typical site or location.  Will recommend a silver Mepilex dressing and changing every 3 days.  Implement pressure injury prevention strategies assisting with every 2 hours turns dry flow pads in place on an agility soft offloading surface.  Patient will need follow-up depending on his course of stay with some type of outpatient wound care whether that be inpatient rehab or  with the outpatient wound care center     I examined the patient using the appropriate personal protective equipment.       Aniya Black, APRN   2021   12:37 EDT                 [facility] Medicine Services  DISCHARGE SUMMARY           Prepared For PCP:  Leisa Malik PA-C     Patient Name: Raz Valencia  : 1960  MRN: [id number]        Date of Admission:   2021     Date of Discharge:  2021     Length of stay:  LOS: 7 days      Hospital Course      Presenting Problem:   Pneumonia [J18.9]              Active Hospital Problems     Diagnosis   POA   • **Neutropenia (CMS/HCC) [D70.9]   Yes   • Pressure injury of left thigh, unstageable (CMS/HCC) [L89.220]   Unknown   • Pneumonia [J18.9]   Yes   • Alcoholism (CMS/HCC) [F10.20]   Yes   • Thrombocytopenic disorder (CMS/HCC) [D69.6]   Yes   • Nicotine dependence [F17.200]   Yes   • Felty's syndrome (CMS/HCC) [M05.00]   Yes   • Rheumatoid arthritis (CMS/HCC) [M06.9]   Yes   • MARTINEZ (acute kidney injury) (CMS/HCC) [N17.9]   Yes   • Bilateral lower extremity edema [R60.0]   Yes   • Atrial fibrillation (CMS/HCC) [I48.91]   Yes       Resolved Hospital Problems   No resolved problems to display.      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  Plaquenil use a contributing factor.  Methotrexate was stopped in 2021 due to worsening of anemia and neutropenia.  On folic acid. In 3/2021, white blood cell count 2.3, hemoglobin 12.1, platelets 126,000, ANC was 0.0. Platelets had acutely declined. S/p bone marrow biopsy in .  Pancytopenia work-up thus far showed no LEENA, nutritional deficiencies or hemolysis. CMV/EBV IgM's negative. Coags normal.  Peripheral blood flow showed increase in T LGL's suspicious for T-cell leukemia which could also be contributing to the cytopenias.  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  All counts improving.  -Heme-onc following     CAP   -XR chest 1 view at [facility] shows patchy peripheral airspace opacities in the right lung field and bibasilar atelectasis and/or consolidation  -In the [facility] ED 1 g IV meropenem, 1000 mg IV vancomycin given  -CT chest shows bilateral pneumonia  -Blood cultures NGTD  -ID following,Omnicef 300 mg p.o. twice daily for 7 days  Continue supportive care  Okay to discharge from Infectious Disease standpoint  -Okay to discharge to rehab     New onset A. fib  Elevated BNP  -EKG showed A. fib, rate controlled  -BNP elevated however echo shows preserved EF with mild concentric LVH, no diastolic dysfunction noted  -Rate controlled, not a candidate for full anticoagulation at this time due to pancytopenia including thrombocytopenia  -Discussed with cardiology, recommending baby aspirin, started as okay with hematology  -Cardiology following, okay to discharge     MARTINEZ, improving  -Likely due to Lasix, stopped, on gentle fluid  -Creatinine improving  -Nephrology following     Alcohol abuse, stable  -Counseled on cessation  -Continue CIWA protocol with Ativan as needed  -Continue multivitamin, folate, thiamine  -Monitor     Felty syndrome/rheumatoid arthritis  -Continue home oral prednisone with gabapentin  -Continue supportive care  -PT/OT, recommending rehab placement, awaiting placement        Hospital Course:  Raz Valencia is a 61 y.o. male        61 y.o. male W/PMH of Felty's syndrome, A. fib, EtOH abuse, bone necrosis, thrombocytopenic disorder, PVD, LEENA, neutropenia, nicotine dependence resents to [facility] as a transfer from [facility] due to neutropenia, right lower lobe pneumonia.  Chart review shows patient seen in the [facility] ED on 05/22/2021 with a 1 week history of fever, body aches and decreased appetite.  Patient was given IV fluids/ABX and discharged home with the instructions to follow-up with PCP due to low WBC count  "and renal failure.  ED contacted patient with positive blood culture for gram-positive cocci, patient called an ambulance to return to [facility] ED. Patient states dyspnea has progressively worsened over the last week, dyspnea is worse with exertion and relieved with rest.  On admission, imaging showing bilateral pneumonia.  Started on broad-spectrum IV antibiotics, ID, hematology were consulted.  Did have new onset A. fib on admission, cardiology also consulted.  Initially looked volume overloaded so was diuresed however echo showed EF well-preserved and patient had MARTINEZ from diuresis, diuretics stopped.  Nephrology followed the patient.  Creatinine stable now.  Pancytopenia improved.  No bleeding noted.  Hematology work-up underway.  ID switched him to p.o. antibiotics and okay for discharge.  Cardiology recommending baby aspirin for anticoagulation, okay with hematology.  Patient not candidate for full anticoagulation due to thrombocytopenia.  PT/OT recommend rehab placement.  Patient stable for discharge to rehab awaiting pre-CERT.        Date::    5/26/2021: Patient seen examined this morning.  States he is feeling better compared to yesterday but still feels generalized weak and \"not well\".  Was on methotrexate previously, has history of blood disorder but is unsure of details.  Admits to drinking 4-5 beers pretty much every day however last drink was 1 week ago.  Denies any cardiac history or any cardiac work-up previously.     5/27/2021: Patient seen examined this morning.  Feels significantly better today, overall improving.  Generalized weakness also improving.  Denies any complaints today.  Creatinine trending up, nephrology consulted.     5/28/2021: Patient seen examined this morning.  Continues to feel better, weakness improving.  Creatinine also improving, discussed with cardiology regarding baby aspirin, will discuss with hematology.     5/29/2021: Patient seen examined this morning.  Doing well, continues " to feel better.  No complaints.  Awaiting rehab placement.     5/30/2021: Patient seen and examined this morning.  Continues to do well, weakness improving.  Breathing stable.  No complaints today.  Awaiting rehab placement.     5/31  Patient feels much better now wants to go home  Data reports notes and imaging and labs reviewed     6/1  Patient seen by Dr. Hensley yesterday who recommended total of 7 days of Omnicef.  He could not find any positive blood cultures from outlying facility at [facility].  Discharge postponed secondary to need for preparation for home health to see him which was not possible on the weekend with the holiday on Monday.        Recommendation for Outpatient Providers:         Healthy cardiac diet  Take medication as prescribed  Stop alcohol and tobacco  Follow-up with nephrology, hematology and cardiology in 1 to 2 months  Follow-up with PCP next wee        Reasons For Change In Medications and Indications for New Medications:           Day of Discharge      HPI:         Vital Signs:   Temp:  [97.6 °F (36.4 °C)-98.1 °F (36.7 °C)] 98 °F (36.7 °C)  Heart Rate:  [70-79] 72  Resp:  [16-20] 20  BP: (115-137)/(68-77) 137/70      Physical Exam:  Physical Exam  Awake alert oriented x3 no acute distress  Cardiopulmonary exam shows S1-2 heard no extra sounds or murmur  Respiratory diminished air entry bilateral no adventitious sounds  Abdomen soft  Lower extremity with mild edema  No focal neurologic deficit     Pertinent  and/or Most Recent Results                  Results from last 7 days   Lab Units 06/01/21  0230 05/31/21  0307 05/30/21  0720 05/29/21  0423 05/28/21  0407 05/27/21  0215 05/26/21  1037 05/26/21  0810   WBC 10*3/mm3 2.00* 2.30* 2.40* 2.80* 2.30* 1.30* 0.60*  --    HEMOGLOBIN g/dL 9.7* 9.8* 10.4* 10.2* 10.0* 10.2* 10.4*  --    HEMATOCRIT % 28.4* 28.3* 30.0* 29.5* 29.0* 29.3* 30.4*  --    PLATELETS 10*3/mm3 104* 96* 96* 95* 77* 63* 63*  --    SODIUM mmol/L 137 137 133* 138 138 139   --  135*   POTASSIUM mmol/L 4.1 3.7 4.1 4.1 3.9 3.9  --  4.1   CHLORIDE mmol/L 105 103 101 105 105 108*  --  102   CO2 mmol/L 22.0 24.0 22.0 23.0 23.0 17.0*  --  21.0*   BUN mg/dL 27* 29* 38* 44* 51* 44*  --  31*   CREATININE mg/dL 1.08 1.18 1.25 1.36* 1.33* 1.77*  --  1.23   GLUCOSE mg/dL 96 96 94 98 118* 146*  --  134*   CALCIUM mg/dL 8.3* 8.8 8.9 8.9 8.6 8.5*  --  8.5*             Results from last 7 days   Lab Units 05/27/21  0215 05/26/21  1037 05/1958   BILIRUBIN mg/dL 0.5  --  0.8   ALK PHOS U/L 121*  --  67   ALT (SGPT) U/L 26  --  12   AST (SGOT) U/L 44*  --  20   PROTIME Seconds  --  11.6  --    INR    --  1.06  --    APTT seconds  --  29.5  --              Invalid input(s): TG, LDLCALC, LDLREALC          Results from last 7 days   Lab Units 05/28/21  0407 05/26/21  0810 05/25/21 2203 05/1958   PROBNP pg/mL  --  7,774.0*  --  7,361.0*   TROPONIN T ng/mL  --  <0.010  --   --    LACTATE mmol/L 1.8  --  1.5  --                                Brief Urine Lab Results  (Last result in the past 365 days)       Color   Clarity   Blood   Leuk Est   Nitrite   Protein   CREAT   Urine HCG         05/25/21 2106 Dark Yellow Clear Negative Negative Negative 30 mg/dL (1+)                                Microbiology Results Abnormal      Procedure Component Value - Date/Time     Blood Culture - Blood, Arm, Right [[id number]] Collected: 05/1958     Lab Status: Final result Specimen: Blood from Arm, Right Updated: 05/30/21 2045       Blood Culture No growth at 5 days     Blood Culture - Blood, Hand, Right [[id number]] Collected: 05/1958     Lab Status: Final result Specimen: Blood from Hand, Right Updated: 05/30/21 2045       Blood Culture No growth at 5 days     Respiratory Panel PCR w/COVID-19(SARS-CoV-2) JENNIE/MADALYN/MARY/PAD/COR/MAD/ARIANA In-House, NP Swab in UTM/VTM, 3-4 HR TAT - Swab, Nasopharynx [[id number]]  (Normal) Collected: 05/25/21 2032     Lab Status: Final result Specimen: Swab from Nasopharynx  Updated: 05/25/21 2156       ADENOVIRUS, PCR Not Detected       Coronavirus 229E Not Detected       Coronavirus HKU1 Not Detected       Coronavirus NL63 Not Detected       Coronavirus OC43 Not Detected       COVID19 Not Detected       Human Metapneumovirus Not Detected       Human Rhinovirus/Enterovirus Not Detected       Influenza A PCR Not Detected       Influenza B PCR Not Detected       Parainfluenza Virus 1 Not Detected       Parainfluenza Virus 2 Not Detected       Parainfluenza Virus 3 Not Detected       Parainfluenza Virus 4 Not Detected       RSV, PCR Not Detected       Bordetella pertussis pcr Not Detected       Bordetella parapertussis PCR Not Detected       Chlamydophila pneumoniae PCR Not Detected       Mycoplasma pneumo by PCR Not Detected     Narrative:       In the setting of a positive respiratory panel with a viral infection PLUS a negative procalcitonin without other underlying concern for bacterial infection, consider observing off antibiotics or discontinuation of antibiotics and continue supportive care. If the respiratory panel is positive for atypical bacterial infection (Bordetella pertussis, Chlamydophila pneumoniae, or Mycoplasma pneumoniae), consider antibiotic de-escalation to target atypical bacterial infection.     S. Pneumo Ag Urine or CSF - Urine, Urine, Clean Catch [[id number]]  (Normal) Collected: 05/25/21 2106     Lab Status: Final result Specimen: Urine, Clean Catch Updated: 05/25/21 2129       Strep Pneumo Ag Negative     Legionella Antigen, Urine - Urine, Urine, Clean Catch [[id number]]  (Normal) Collected: 05/25/21 2106     Lab Status: Final result Specimen: Urine, Clean Catch Updated: 05/25/21 2129       LEGIONELLA ANTIGEN, URINE Negative               Last 30 day radiology impressions    XR Bone Survey Complete     Result Date: 5/28/2021  Impression: 1. Small nonspecific lucent lesions in C2 vertebral body measuring 6 mm and at the right humeral head measuring 5 mm. 2. No  acute cardiopulmonary process. 4. Atherosclerotic calcifications with suspected 4.4 cm infrarenal aortic aneurysm, consider follow-up abdominal CT.  Electronically Signed By-Julien Reid MD On:5/28/2021 2:41 PM This report was finalized on [id number] by  Julien Reid MD.     CT Chest Without Contrast Diagnostic     Result Date: 5/26/2021  Impression:  1. Abnormal appearance of the lungs which can be seen with an atypical infection such as Covid. 2. Trace layering pleural effusions. 3. Mildly enlarged mediastinal lymph nodes in the right paratracheal region. 4. Splenomegaly is suspected. 5. Bilateral gynecomastia.  Electronically Signed By-Laz Arce MD On:5/26/2021 8:14 PM This report was finalized on [id number] by  Laz Arce MD.     US Spleen     Result Date: 5/27/2021  Impression: Spleen size just at upper limits normal, 14.1 cm.  Electronically Signed By-Gloria Watson MD On:5/27/2021 3:05 PM This report was finalized on [id number] by  Gloria Watson MD.     XR Chest 1 View     Result Date: 5/26/2021  Impression: 1. Vague patchy bilateral airspace opacities likely representing atypical infection.  Electronically Signed By-Vasyl Noriega MD On:5/26/2021 7:18 AM This report was finalized on [id number] by  Vasyl Noriega MD.               Results for orders placed during the hospital encounter of 05/25/21     Adult Transthoracic Echo Complete w/ Color, Spectral and Contrast if Necessary Per Protocol     Interpretation Summary  · Left ventricular wall thickness is consistent with mild concentric hypertrophy.  · Estimated left ventricular EF = 60% Left ventricular systolic function is normal.  · Estimated right ventricular systolic pressure from tricuspid regurgitation is normal (<35 mmHg).                    Test Results Pending at Discharge       Pending Labs      Order Current Status     ALVERTO+Protein Electro, 24-Hr Urine - In process     Immunofixation, Serum In process     Immunoglobulins  A/E/G/M Serum In process     T-Cell Gene Rearrangement, PCR In process                Procedures Performed           Consults:           Consults      Date and Time Order Name Status Description     5/27/2021  8:01 AM Inpatient Nephrology Consult Completed       5/26/2021 10:40 AM Inpatient Cardiology Consult Completed       5/26/2021  7:52 AM Inpatient Infectious Diseases Consult Completed       5/25/2021  9:23 PM Hematology & Oncology Inpatient Consult Completed                  Discharge Details               Discharge Medications            New Medications      Instructions Start Date   aspirin 81 MG EC tablet    81 mg, Oral, Daily        cefdinir 300 MG capsule  Commonly known as: OMNICEF    300 mg, Oral, Every 12 Hours Scheduled        famotidine 20 MG tablet  Commonly known as: PEPCID    20 mg, Oral, 2 Times Daily Before Meals        folic acid 1 MG tablet  Commonly known as: FOLVITE    500 mcg, Oral, Daily        Andreas pack    1 packet, Oral, 2 Times Daily        thiamine 100 MG tablet  tablet  Commonly known as: VITAMIN B-1    100 mg, Oral, Daily                      Continue These Medications      Instructions Start Date   gabapentin 400 MG capsule  Commonly known as: NEURONTIN    400 mg, Oral, 3 Times Daily        HYDROcodone-acetaminophen 7.5-325 MG per tablet  Commonly known as: NORCO    1 tablet, Oral, Every 4 Hours PRN        predniSONE 5 MG tablet  Commonly known as: DELTASONE    5 mg, Oral, Daily            Stop These Medications    hydroxychloroquine 200 MG tablet  Commonly known as: PLAQUENIL                No Known Allergies        Discharge Disposition:  Skilled Nursing Facility (DC - External)     Diet:  Hospital:      Diet Order   Procedures   • Diet Neutropenic Diet            Discharge Activity:         CODE STATUS:        Code Status and Medical Interventions:   Ordered at: 05/25/21 4161     Level Of Support Discussed With:     Patient     Code Status:     CPR     Medical Interventions  (Level of Support Prior to Arrest):     Full            Follow-up Appointments  No future appointments.          Additional Instructions for the Follow-ups that You Need to Schedule      Ambulatory Referral to Home Health   As directed        [facility] to eval and treat     Order Comments: [facility] to eval and treat     Face to Face Visit Date: 5/31/2021    Follow-up provider for Plan of Care?: I treated the patient in an acute care facility and will not continue treatment after discharge.    Follow-up provider: CIAN BRUSH [[id number]]    Reason/Clinical Findings: Rheumatiod arthritis    Describe mobility limitations that make leaving home difficult: weakness    Nursing/Therapeutic Services Requested: Other    Frequency: 1 Week 1           Ambulatory Referral to Home Health   As directed        Face to Face Visit Date: 5/31/2021    Follow-up provider for Plan of Care?: I treated the patient in an acute care facility and will not continue treatment after discharge.    Follow-up provider: CAIN BRUSH [[id number]]    Reason/Clinical Findings: post hospital evaluation    Describe mobility limitations that make leaving home difficult: weakness    Nursing/Therapeutic Services Requested: Other ([facility] to evaluate )    Frequency: 1 Week 1                      Condition on Discharge:       Stable        This patient has been examined wearing appropriate Personal Protective Equipment and discussed with hospital infection control department. 06/01/21        Electronically signed by Uziel Rodriguez MD, 05/31/21, 12:57 PM EDT.        Time: I spent 35 minutes on this discharge activity which included face-to-face encounter with the patient/reviewing the data in the system/coordination of the care with the nursing staff as well as consultants/documentation/entering orders.              Routing History

## 2021-06-01 NOTE — THERAPY TREATMENT NOTE
Subjective: Pt agreeable to therapeutic plan of care, feeling much better each day and wants to go home. Lives with 80 yo female who has dtrs that come daily to assist.     Objective:     Bed mobility - SBA uses bedrail which he has at home, painful and slow with reported gluteal wound  Transfers - Modified-Independent  Ambulation - 450' Modified-Independent flexed posture, no standing rest breaks required. Increased work of breathing, though conversive throughout on room air.     Pain: 3/10 VAS wound on buttocks, only noted with pressure and requested to return to bed in sidelying position which relieved pain.     Education: Provided education on importance of mobility and skilled verbal / tactile cueing throughout intervention. Discussed smoking cessation and energy conservation strategies, pt motivated to quit, has smoked since 13 yo.     Assessment: Raz Valencia presents with functional mobility impairments which indicate the need for skilled intervention. Tolerating session today without incident. Will continue to follow and progress as tolerated.     Plan/Recommendations:   Pt would benefit from Home with Home Health at discharge from facility and requires no DME at discharge. Owns rw for home use, does not typically use. Currently reliant on rw, thus recommending HH to reduce risk of readmission, increase strength and functional capacity in home environment.    Pt desires Home with Home Health at discharge. Roommate has family members that come daily and assist with yardwork and additional home management tasks as necessary. Pt cooperative; agreeable to therapeutic recommendations and plan of care.     Basic Mobility 6-click:  Rollin = Total, A lot = 2, A little = 3; 4 = None  Supine>Sit:   1 = Total, A lot = 2, A little = 3; 4 = None   Sit>Stand with arms:  1 = Total, A lot = 2, A little = 3; 4 = None  Bed>Chair:   1 = Total, A lot = 2, A little = 3; 4 = None  Ambulate in room:  1 = Total, A  lot = 2, A little = 3; 4 = None  3-5 Steps with railin = Total, A lot = 2, A little = 3; 4 = None  Score: 23    Modified Combs: 0 = No Symptoms    Post-Tx Position: Supine with HOB Elevated, Dr. Rodriguez in room.   PPE: gloves, surgical mask, eyewear protection

## 2021-06-01 NOTE — DISCHARGE INSTRUCTIONS
Healthy cardiac diet  Take medication as prescribed  Stop alcohol and tobacco  Follow-up with nephrology, hematology and cardiology in 1 to 2 months  Follow-up with PCP next week

## 2021-06-01 NOTE — CASE MANAGEMENT/SOCIAL WORK
Continued Stay Note  [facility]     Patient Name: Raz Valencia  MRN: [id number]  Today's Date: 6/1/2021    Admit Date: 5/25/2021    Discharge Plan     Row Name 06/01/21 1540       Plan    Plan  Anticipate routine home with [facility], ordered and accepted.    Plan Comments  PT now recommends home health. Confirmed with Danisha at [facility] that patient is accepted and they will see patient in the home on Thursday. Faxed face sheet, order, and notes through EPIC link. Updated patient at bedside, and he remains agreeable. Discharge orders noted.    Final Discharge Disposition Code  06 - home with home health care    Final Note  Home with [facility]        Met with patient in room wearing PPE: mask, goggles.    Maintained distance greater than six feet and spent less than 15 minutes in the room.            Mayuri Serna RN

## 2021-06-01 NOTE — PLAN OF CARE
Bed mobility - SBA uses bedrail which he has at home, painful and slow with reported gluteal wound  Transfers - Modified-Independent  Ambulation - 450' Modified-Independent flexed posture, no standing rest breaks required. Increased work of breathing, though conversive throughout on room air.     Pt would benefit from Home with Home Health at discharge from facility and requires no DME at discharge. Owns rw for home use, does not typically use. Currently reliant on rw, thus recommending HH to reduce risk of readmission, increase strength and functional capacity in home environment.

## 2021-06-01 NOTE — PROGRESS NOTES
Hematology/Oncology Inpatient Progress Note    PATIENT NAME: Raz Valencia  : 1960  MRN: [id number]    CHIEF COMPLAINT: Shortness of breath     HISTORY OF PRESENT ILLNESS:    61 y.o. male presented to [facility] on 2021 as a transfer from [facility]  due to neutropenia and right lower lobe pneumonia.  Patient had initially presented to [facility] emergency department on 2021 with a one week history of fever, decreased appetite, and myalgias.  Patient was given IV fluids and antibiotics and was discharged home with instructions to follow-up with primary care physician due to low white blood count and renal failure.  Their emergency department contacted the patient on 2021 with instructions to return to the hospital due to a positive blood culture for gram-positive cocci.  His dyspnea had gotten worse over the prior one week.  The patient had additional complaints of fever, productive cough, chills, body aches, tachycardia, and palpitations. Labs in the [facility] emergency department were as follows: White blood count 1.2, hemoglobin 11.2, platelets 60,000, , sodium 135, potassium 3.9, chloride 106, CO2 20, glucose 101, BUN 33, creatinine 1.7, ALT 19, AST 34, alkaline phosphatase 82, total bili 1.3, calcium 9.3, lactic acid 1.8.  A chest x-ray showed patchy peripheral airspace opacities in the right lung field and bibasilar atelectasis and/or consolidation.  He received 1 g of IV meropenem, 1 L of normal saline,  and IV vancomycin at [facility]. Blood cultures were drawn on arrival to [facility]. Infectious disease was consulted.  The patient was started on IV Rocephin and doxycycline.  A chest x-ray on 2021 revealed vague patchy bilateral airspace opacities likely representing atypical infection.      21  Hematology/Oncology was consulted  by  TITUS Pedersen  for leukopenia. A review of [facility] showed the patient was previously seen for anemia and neutropenia on 03/23/2021 by Dr. Nick Espana, Oncologist, at [facility] for worsening pancytopenia.  Patient has a diagnosis of Felty's syndrome. He had been started on treatment for rheumatoid arthritis with methotrexate and was tolerating without difficulty.  In January of 2021, his methotrexate was stopped due to anemia and neutropenia. At that time, white blood cell count was 2.3, hemoglobin 12.1, platelets 126,000, ANC 0.0.  Comparatively CBC on 10/24/2019 showed white blood count 5.5, hemoglobin 12.5, platelets 125,000. He had a bone marrow biopsy in 2013 that showed minimally decreased cellularity with marked decrease in granulopoiesis, predominance of T lymphocytes with rare small non-paratrabecular B-cell lymphoid aggregate. No indication of acute leukemia, lymphoma, metastatic tumor or granulomatous infiltrate.  It was was initially thought that his worsening pancytopenia could have been related to methotrexate use. Recommendation was made to continue to hold methotrexate and repeat a CBC in May 2021.  If there was any persistent worsening of blood counts, then the plan was for potentially to repeat a bone marrow biopsy.     Past Medical History: Felty's syndrome, leukopenia, neutropenic fever (05/25/2021), pneumonia (05/25/2021), alcohol abuse, tobacco abuse, peripheral vascular disease, iron deficiency anemia, and rheumatoid arthritis.  Surgical History:  has no past surgical history on file.  Social History: He resides in [place], [place].  He reports that he has been smoking.about 3 packs per day. He has never used smokeless tobacco. He reports current alcohol use of about 4 standard drinks of alcohol per week. He reports previous drug use.  Family History: Family history is not on file.  Allergies:No Known Allergies     PCP: Leisa Malik PA-C    INTERVAL  HISTORY:  · 5/27/2021 -White blood count 1.3, hemoglobin 10.2, MCV 97.9, platelets 63,000, . Occult blood stool - negative, reticulocytes 1.1 (0.70-1.90), haptoglobin 231 (), iron 50 (), iron saturation 28 (20-50), transferrin 118 (200-360), TIBC 176 (298-536), ferritin 2555 (), folate 10.5 (4.78-24.20), vitamin B12 1910 (211-946), CMV IgM <30.0 (<30.0), EBV IgM <36 (<36).  PT 11.6 (9.6-11.7), INR 1.06 (0.93-1.10), PTT 29.5 (24.0-31.0). Peripheral blood smear showed reactive lymphocytes present. Records reviewed from [facility].  CT chest 5/26/2021- Abnormal appearance of the lungs which can be seen with an atypical infection such as Covid. Trace layering pleural effusions. Mildly enlarged mediastinal lymph nodes in the right paratracheal region. Splenomegaly is suspected.  Ultrasound of spleen showed size at upper limits of normal (14.1cm). Bilateral gynecomastia. 2D echocardiogram-5/25/2021-Left ventricular wall thickness is consistent with mild concentric hypertrophy, estimated left ventricular EF = 60%, left ventricular systolic function is normal, and estimated right ventricular systolic pressure from tricuspid regurgitation is normal.   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  SPEP showed M spike x2 (0.23 and 0.21g/dL in gamma region).  SPEP this admission showed an M spike of 0.4 g/dL.  Flow cytometry showed markedly decreased neutrophils with an increase in T cells, consistent with T-LGL's.  White count 2.3, ANC 1.1, 4% atypical lymphocytes, hemoglobin 10, platelet count 77.  · 5/29/21-bone survey showed an infrarenal aortic aneurysm measuring 4.4 cm.  There was small nonspecific lucent lesions at C2 (6 mm), and right humeral head (5 mm).  WBC 2.8, ANC 1.1, hemoglobin 10.2, platelets 95.  Copper 159 ().  · 5/31/2021-WBC 2.3, hemoglobin 9.8 and a platelet count  96,000.    History of present illness reviewed since last visit and changes noted on 06/01/21.    Subjective   Feels all right and wanted to go home.  Did have bowel incontinence yesterday.  Walking.      ROS:  Review of Systems   Constitutional: Negative for activity change, chills, fatigue, fever and unexpected weight change.   HENT: Negative for congestion, dental problem, hearing loss, mouth sores, nosebleeds, sore throat and trouble swallowing.    Eyes: Negative for photophobia, pain and visual disturbance.   Respiratory: Negative for cough, chest tightness and shortness of breath.    Cardiovascular: Negative for chest pain, palpitations and leg swelling.   Gastrointestinal: Negative for abdominal distention, abdominal pain, blood in stool, constipation, diarrhea, nausea and vomiting.   Endocrine: Negative for cold intolerance and heat intolerance.   Genitourinary: Negative for decreased urine volume, difficulty urinating, dysuria, frequency, hematuria and urgency.   Musculoskeletal: Negative for arthralgias and gait problem.   Skin: Negative for rash and wound.   Neurological: Negative for dizziness, tremors, seizures, syncope, weakness, light-headedness, numbness and headaches.   Hematological: Negative for adenopathy. Does not bruise/bleed easily.   Psychiatric/Behavioral: Negative for confusion and hallucinations. The patient is not nervous/anxious.    All other systems reviewed and are negative.     MEDICATIONS:    Scheduled Meds:  aspirin, 81 mg, Oral, Daily  cefdinir, 300 mg, Oral, Q12H  famotidine, 20 mg, Oral, BID AC  folic acid, 500 mcg, Oral, Daily  gabapentin, 400 mg, Oral, TID  ipratropium-albuterol, 3 mL, Nebulization, 4x Daily - RT  Andreas, 1 packet, Oral, BID  predniSONE, 5 mg, Oral, Daily  sodium chloride, 10 mL, Intravenous, Q12H  thiamine, 100 mg, Oral, Daily       Continuous Infusions:  sodium chloride, 50 mL/hr, Last Rate: 50 mL/hr (05/27/21 1249)       PRN Meds:  •  acetaminophen **OR**  "acetaminophen **OR** acetaminophen  •  HYDROcodone-acetaminophen  •  LORazepam **OR** LORazepam **OR** LORazepam **OR** LORazepam **OR** LORazepam **OR** LORazepam  •  melatonin  •  ondansetron **OR** ondansetron  •  sodium chloride     ALLERGIES:  No Known Allergies    Objective    VITALS:   /70 (BP Location: Right arm, Patient Position: Lying)   Pulse 76   Temp 98 °F (36.7 °C) (Oral)   Resp 18   Ht 177.8 cm (70\")   Wt 112 kg (245 lb 13 oz)   SpO2 100%   BMI 35.27 kg/m²     PHYSICAL EXAM:  Physical Exam  Vitals and nursing note reviewed.   Constitutional:       General: He is not in acute distress.     Appearance: Normal appearance. He is well-developed. He is obese. He is not diaphoretic.   HENT:      Head: Normocephalic and atraumatic.      Right Ear: External ear normal.      Left Ear: External ear normal.      Nose: Nose normal. No rhinorrhea.      Mouth/Throat:      Mouth: Mucous membranes are moist.      Dentition: Abnormal dentition ( edentulous ).      Pharynx: Oropharynx is clear. No oropharyngeal exudate or posterior oropharyngeal erythema.      Comments: Edentulous  Eyes:      General: No scleral icterus.        Right eye: No discharge.         Left eye: No discharge.      Extraocular Movements: Extraocular movements intact.      Conjunctiva/sclera: Conjunctivae normal.      Pupils: Pupils are equal, round, and reactive to light.   Cardiovascular:      Rate and Rhythm: Normal rate and regular rhythm.      Heart sounds: Normal heart sounds. No murmur heard.        Comments: Cardiac monitor leads.   Pulmonary:      Effort: Pulmonary effort is normal. No respiratory distress.      Breath sounds: Normal breath sounds. No stridor. No wheezing or rales.   Abdominal:      General: Bowel sounds are normal. There is no distension.      Palpations: Abdomen is soft. There is no mass.      Tenderness: There is no abdominal tenderness. There is no guarding or rebound.      Comments: Obese abdomen "   Genitourinary:     Comments: Deferred   Musculoskeletal:         General: No swelling, tenderness or deformity. Normal range of motion.      Cervical back: Normal range of motion and neck supple.      Right lower leg: No edema.      Left lower leg: No edema.      Comments: Left upper extremity peripheral IV   Lymphadenopathy:      Cervical: No cervical adenopathy.      Upper Body:      Right upper body: No supraclavicular adenopathy.      Left upper body: No supraclavicular adenopathy.   Skin:     General: Skin is warm and dry.      Coloration: Skin is not pale.      Findings: Bruising and ecchymosis ( BUE) present. No erythema or rash.      Comments: Stasis changes to bilateral lower extremities.   Neurological:      General: No focal deficit present.      Mental Status: He is alert and oriented to person, place, and time.      Coordination: Coordination normal.   Psychiatric:         Mood and Affect: Mood normal.         Behavior: Behavior normal.         Thought Content: Thought content normal.         Judgment: Judgment normal.         RECENT LABS:  Lab Results (last 24 hours)     Procedure Component Value Units Date/Time    Manual Differential [[id number]]  (Abnormal) Collected: 06/01/21 0230    Specimen: Blood Updated: 06/01/21 0612     Neutrophil % 17.0 %      Lymphocyte % 62.0 %      Monocyte % 2.0 %      Eosinophil % 9.0 %      Basophil % 1.0 %      Bands %  6.0 %      Metamyelocyte % 1.0 %      Atypical Lymphocyte % 2.0 %      Neutrophils Absolute 0.46 10*3/mm3      Lymphocytes Absolute 1.28 10*3/mm3      Monocytes Absolute 0.04 10*3/mm3      Eosinophils Absolute 0.18 10*3/mm3      Basophils Absolute 0.02 10*3/mm3      Macrocytes Slight/1+     WBC Morphology Normal     Platelet Morphology Normal    CBC & Differential [[id number]]  (Abnormal) Collected: 06/01/21 0230    Specimen: Blood Updated: 06/01/21 0612    Narrative:      The following orders were created for panel order CBC & Differential.  Procedure                                Abnormality         Status                     ---------                               -----------         ------                     Scan Slide[[id number]]                                       Final result               CBC Auto Differential[[id number]]        Abnormal            Final result                 Please view results for these tests on the individual orders.    Scan Slide [[id number]] Collected: 06/01/21 0230    Specimen: Blood Updated: 06/01/21 0612     Scan Slide --     Comment: See Manual Differential Results       CBC Auto Differential [[id number]]  (Abnormal) Collected: 06/01/21 0230    Specimen: Blood Updated: 06/01/21 0612     WBC 2.00 10*3/mm3      RBC 2.88 10*6/mm3      Hemoglobin 9.7 g/dL      Hematocrit 28.4 %      MCV 98.8 fL      MCH 33.6 pg      MCHC 34.0 g/dL      RDW 15.1 %      RDW-SD 51.6 fl      MPV 8.7 fL      Platelets 104 10*3/mm3     Narrative:      The previously reported component NRBC is no longer being reported. Previous result was 0.2 /100 WBC (Reference Range: 0.0-0.2 /100 WBC) on 6/1/2021 at 0340 EDT.    Basic Metabolic Panel [[id number]]  (Abnormal) Collected: 06/01/21 0230    Specimen: Blood Updated: 06/01/21 0404     Glucose 96 mg/dL      BUN 27 mg/dL      Creatinine 1.08 mg/dL      Sodium 137 mmol/L      Potassium 4.1 mmol/L      Chloride 105 mmol/L      CO2 22.0 mmol/L      Calcium 8.3 mg/dL      eGFR Non African Amer 69 mL/min/1.73      BUN/Creatinine Ratio 25.0     Anion Gap 10.0 mmol/L     Narrative:      GFR Normal >60  Chronic Kidney Disease <60  Kidney Failure <15          PENDING RESULTS: T-cell gene rearrangement and cytogenetics on peripheral blood, SIFE,  immunoglobulins, free light chain ratio, UIFE, UPEP.    IMAGING REVIEWED:  No radiology results for the last day  I have reviewed the patient's labs, imaging, reports, and other clinician documentation.    Assessment/Plan   ASSESSMENT  1. Pancytopenia: May be secondary to Felty's  syndrome which causes neutropenia and splenomegaly in patients with rheumatoid arthritis.  Plaquenil use a contributing factor.  Methotrexate was stopped in January 2021 due to worsening of anemia and neutropenia.  On folic acid. In 3/2021, white blood cell count 2.3, hemoglobin 12.1, platelets 126,000, ANC was 0.0. Platelets had acutely declined. S/p bone marrow biopsy in 2013.  Pancytopenia work-up thus far showed no LEENA, nutritional deficiencies or hemolysis. CMV/EBV IgM's negative. Coags normal.  Peripheral blood flow showed increase in T LGL's suspicious for T-cell leukemia which could also be contributing to the cytopenias.  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  All counts improving.  2. IgG kappa MGUS-diagnosed 2019.  Urine studies, SIFE, light chain ratio and immunoglobulins pending.  Bone survey showed 2 small nonspecific lucent lesions- one in cervical spine and one in right humerus.  Calcium normal and creatinine mildly elevated.    3. Mediastinal Lymphadenopathy: seen on CT Chest 5/26/2021. Spleen upper limits of normal.   4. Pneumonia: CXR and chest CT both showed atypical infection. On cefdinir. Infectious disease consulted.  Patient was reported with positive blood culture 5/22/2021 for gram positive cocci at outlying facility but those results can not be found-blood cultures here-no growth to date.  Afebrile.  5. Rheumatoid arthritis: Off methotrexate.  On oral prednisone and Plaquenil on hold due to cytopenias.  Started Pepcid.  On gabapentin and Norco for pain.      6. Atrial fibrillation with RVR/elevated BNP: Cardiology consulted. On ASA 81 mg. 2D echocardiogram showed LVEF of 60%.  7. Acute kidney injury: Appears to be secondary to diuretics, Lasix has been d/c'ed. Nephrology consulted.  8. Alcohol abuse/Tobacco abuse/infrarenal aortic aneurysm:  Management per Primary team.      PLAN  1. Follow CBC.  2. Await T-cell gene rearrangement and  cytogenetics on peripheral blood.   3. Continue to hold Plaquenil.  4. Follow-up chest CT as outpatient.  5. SIFE, Free light chain ratio, immunoglobulins, UIFE and UPEP all pending.   6. We will schedule outpatient follow-up here or in [place].      I discussed the patients findings and my recommendations with patient.    Electronically signed by Rex Shepherd MD, 06/01/21, 8:01 AM EDT.

## 2021-06-01 NOTE — PROGRESS NOTES
Infectious Diseases Progress Note      LOS: 7 days   Patient Care Team:  Leisa Malik PA-C as PCP - General (Physician Assistant)  Rex Shepherd MD as Consulting Physician (Hematology and Oncology)    Chief Complaint: Shortness of breath, fatigue    Subjective       The patient has been afebrile for the last 24 hours.  The patient is on room air, hemodynamically stable, and is tolerating antimicrobial therapy.        Review of Systems:   Review of Systems   Constitutional: Positive for fatigue.   HENT: Negative.    Eyes: Negative.    Cardiovascular: Negative.    Gastrointestinal: Negative.    Endocrine: Negative.    Genitourinary: Negative.    Musculoskeletal: Negative.    Skin: Negative.    Neurological: Negative.    Psychiatric/Behavioral: Negative.    All other systems reviewed and are negative.       Objective     Vital Signs  Temp:  [97.6 °F (36.4 °C)-98.1 °F (36.7 °C)] 97.8 °F (36.6 °C)  Heart Rate:  [70-79] 78  Resp:  [18-20] 18  BP: (117-137)/(68-77) 117/72    Physical Exam:  Physical Exam  Vitals and nursing note reviewed.   Constitutional:       General: He is not in acute distress.     Appearance: Normal appearance. He is well-developed and normal weight. He is not diaphoretic.   HENT:      Head: Normocephalic and atraumatic.   Eyes:      Conjunctiva/sclera: Conjunctivae normal.      Pupils: Pupils are equal, round, and reactive to light.   Cardiovascular:      Rate and Rhythm: Normal rate and regular rhythm.      Heart sounds: Normal heart sounds, S1 normal and S2 normal.   Pulmonary:      Effort: Pulmonary effort is normal. No respiratory distress.      Breath sounds: No stridor. Rales present. No wheezing.   Abdominal:      General: Bowel sounds are normal. There is no distension.      Palpations: Abdomen is soft. There is no mass.      Tenderness: There is no abdominal tenderness. There is no guarding.   Musculoskeletal:         General: No deformity. Normal range of motion.      Cervical  back: Neck supple.   Skin:     General: Skin is warm and dry.      Coloration: Skin is not pale.      Findings: No erythema or rash.   Neurological:      Mental Status: He is alert and oriented to person, place, and time.      Cranial Nerves: No cranial nerve deficit.   Psychiatric:         Mood and Affect: Mood normal.          Results Review:    I have reviewed all clinical data, test, lab, and imaging results.     Radiology  No Radiology Exams Resulted Within Past 24 Hours    Cardiology    Laboratory    Results from last 7 days   Lab Units 06/01/21  0230 05/31/21  0307 05/30/21  0720 05/29/21  0423 05/28/21  0407 05/27/21  0215 05/26/21  1037   WBC 10*3/mm3 2.00* 2.30* 2.40* 2.80* 2.30* 1.30* 0.60*   HEMOGLOBIN g/dL 9.7* 9.8* 10.4* 10.2* 10.0* 10.2* 10.4*   HEMATOCRIT % 28.4* 28.3* 30.0* 29.5* 29.0* 29.3* 30.4*   PLATELETS 10*3/mm3 104* 96* 96* 95* 77* 63* 63*     Results from last 7 days   Lab Units 06/01/21 0230 05/31/21  0307 05/30/21 0720 05/29/21  0423 05/28/21  0407 05/27/21  0215 05/26/21  1037 05/26/21  0810 05/25/21  2203 05/1960   SODIUM mmol/L 137 137 133* 138 138 139  --  135*  --  135*   POTASSIUM mmol/L 4.1 3.7 4.1 4.1 3.9 3.9  --  4.1  --  4.1   CHLORIDE mmol/L 105 103 101 105 105 108*  --  102  --  105   CO2 mmol/L 22.0 24.0 22.0 23.0 23.0 17.0*  --  21.0*  --  16.0*   BUN mg/dL 27* 29* 38* 44* 51* 44*  --  31*  --  29*   CREATININE mg/dL 1.08 1.18 1.25 1.36* 1.33* 1.77*  --  1.23  --  1.26   GLUCOSE mg/dL 96 96 94 98 118* 146*  --  134*  --  81   ALBUMIN g/dL  --   --   --  2.70* 2.60* 2.50* 2.2*  --   --  2.40*   BILIRUBIN mg/dL  --   --   --   --   --  0.5  --   --   --  0.8   ALK PHOS U/L  --   --   --   --   --  121*  --   --   --  67   AST (SGOT) U/L  --   --   --   --   --  44*  --   --   --  20   ALT (SGPT) U/L  --   --   --   --   --  26  --   --   --  12   AMMONIA umol/L  --   --   --   --   --   --   --   --  <10*  --    CALCIUM mg/dL 8.3* 8.8 8.9 8.9 8.6 8.5*  --  8.5*  --   8.2*                 Microbiology   Microbiology Results (last 10 days)     Procedure Component Value - Date/Time    Legionella Antigen, Urine - Urine, Urine, Clean Catch [[id number]]  (Normal) Collected: 05/25/21 2106    Lab Status: Final result Specimen: Urine, Clean Catch Updated: 05/25/21 2129     LEGIONELLA ANTIGEN, URINE Negative    S. Pneumo Ag Urine or CSF - Urine, Urine, Clean Catch [[id number]]  (Normal) Collected: 05/25/21 2106    Lab Status: Final result Specimen: Urine, Clean Catch Updated: 05/25/21 2129     Strep Pneumo Ag Negative    Respiratory Panel PCR w/COVID-19(SARS-CoV-2) JENNIE/MADALYN/MARY/PAD/COR/MAD/ARIANA In-House, NP Swab in UTM/VTM, 3-4 HR TAT - Swab, Nasopharynx [[id number]]  (Normal) Collected: 05/25/21 2032    Lab Status: Final result Specimen: Swab from Nasopharynx Updated: 05/25/21 2156     ADENOVIRUS, PCR Not Detected     Coronavirus 229E Not Detected     Coronavirus HKU1 Not Detected     Coronavirus NL63 Not Detected     Coronavirus OC43 Not Detected     COVID19 Not Detected     Human Metapneumovirus Not Detected     Human Rhinovirus/Enterovirus Not Detected     Influenza A PCR Not Detected     Influenza B PCR Not Detected     Parainfluenza Virus 1 Not Detected     Parainfluenza Virus 2 Not Detected     Parainfluenza Virus 3 Not Detected     Parainfluenza Virus 4 Not Detected     RSV, PCR Not Detected     Bordetella pertussis pcr Not Detected     Bordetella parapertussis PCR Not Detected     Chlamydophila pneumoniae PCR Not Detected     Mycoplasma pneumo by PCR Not Detected    Narrative:      In the setting of a positive respiratory panel with a viral infection PLUS a negative procalcitonin without other underlying concern for bacterial infection, consider observing off antibiotics or discontinuation of antibiotics and continue supportive care. If the respiratory panel is positive for atypical bacterial infection (Bordetella pertussis, Chlamydophila pneumoniae, or Mycoplasma pneumoniae),  consider antibiotic de-escalation to target atypical bacterial infection.    Blood Culture - Blood, Arm, Right [[id number]] Collected: 05/1960    Lab Status: Final result Specimen: Blood from Arm, Right Updated: 05/30/21 2045     Blood Culture No growth at 5 days    Blood Culture - Blood, Hand, Right [[id number]] Collected: 05/1960    Lab Status: Final result Specimen: Blood from Hand, Right Updated: 05/30/21 2045     Blood Culture No growth at 5 days          Medication Review:       Schedule Meds  aspirin, 81 mg, Oral, Daily  cefdinir, 300 mg, Oral, Q12H  famotidine, 20 mg, Oral, BID AC  folic acid, 500 mcg, Oral, Daily  gabapentin, 400 mg, Oral, TID  ipratropium-albuterol, 3 mL, Nebulization, 4x Daily - RT  Andreas, 1 packet, Oral, BID  nicotine, 1 patch, Transdermal, Q24H  predniSONE, 5 mg, Oral, Daily  sodium chloride, 10 mL, Intravenous, Q12H  thiamine, 100 mg, Oral, Daily        Infusion Meds  sodium chloride, 50 mL/hr, Last Rate: 50 mL/hr (05/27/21 1249)        PRN Meds  •  acetaminophen **OR** acetaminophen **OR** acetaminophen  •  HYDROcodone-acetaminophen  •  LORazepam **OR** LORazepam **OR** LORazepam **OR** LORazepam **OR** LORazepam **OR** LORazepam  •  melatonin  •  ondansetron **OR** ondansetron  •  sodium chloride        Assessment/Plan       Antimicrobial Therapy   1.  Omnicef     day  2.       day  3.      Day  4.      Day  5.      Day      Assessment     Right lower lobe infiltrate.  Probably community-acquired pneumonia. COVID-19 screen was negative.  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  Patient apparently been treated with Plaquenil and prednisone  White count had improved and ANC is slightly above 1000     The  hospitalist admission note reported that patient had positive blood culture at [facility] in [place].  Repeat blood culture from May 25, 2021 this facility is negative so far  -Patient's RN called [facility]'s microbiology/lab department when she could not get a hold of medical records and they reviewed their records and could not find any history of a positive blood culture for this patient    Plan     Continue Omnicef 300 mg p.o. twice daily for a total of 7 days  Continue supportive care  Okay to discharge from Infectious Disease standpoint    Soheila Hensley MD  06/01/21  17:07 EDT     Note is dictated utilizing voice recognition software/Dragon

## 2021-06-02 LAB
LAB DIRECTOR NAME PROVIDER: NORMAL
Lab: NORMAL
REF LAB TEST METHOD: NORMAL
T CLONAL SIZE: NORMAL
TCRG GENE REAR BLD/T QL: NORMAL

## 2021-06-03 LAB
ALBUMIN 24H MFR UR ELPH: 7.6 %
ALPHA1 GLOB 24H MFR UR ELPH: 6.1 %
ALPHA2 GLOB 24H MFR UR ELPH: 17.1 %
B-GLOBULIN MFR UR ELPH: 28.6 %
GAMMA GLOB 24H MFR UR ELPH: 40.5 %
HIV 1 & 2 AB SER-IMP: ABNORMAL
IGA SERPL-MCNC: 594 MG/DL (ref 61–437)
IGE SERPL-ACNC: 511 IU/ML (ref 6–495)
IGG SERPL-MCNC: 1188 MG/DL (ref 603–1613)
IGM SERPL-MCNC: 174 MG/DL (ref 20–172)
INTERPRETATION UR IFE-IMP: ABNORMAL
M PROTEIN 24H MFR UR ELPH: ABNORMAL %
PROT 24H UR-MRATE: 200 MG/24 HR (ref 30–150)
PROT UR-MCNC: 6.9 MG/DL

## 2021-06-03 NOTE — PROGRESS NOTES
Enter Query Response Below      Query Response:       Unstageable pressure injury to left posterior thigh and stage 2 pressure injuries to perineum and right coccyx (present on admission)     Electronically signed by Uziel Rodriguez MD, 21, 5:13 PM EDT.  [facility] Hospitalist Team         If applicable, please update the problem list.             Patient: Raz Valencia        : 1960  Account: [id number]           Admit Date: 2021        Options to Respond to Query:    1. Access the Encounter     a. From the To-Do Side bar, click Respond With Note.     b. Click New Note     c. Answer query within the yellow box.                d. Update the Problem List if applicable.       By submitting this query, we are merely seeking further clarification of the documentation to accurately reflect all conditions that you are monitoring, evaluating, treating or that extends the hospitalization. Please utilize your independent clinical judgment when addressing the question(s) below.    60 y/o male admitted on  with Neutropenia and Pneumonia. Per wound nurse consult on , Pt. documented to have an unstageable left posterior thigh pressure injury. Recommended silver Mepilex dressing and offloading. Stage 2 pressure injuries noted per nursing assessment to medial perineum and right coccyx. Present on admission. Wounds open to air with offloading and protection provided.     After further study, can the pressure injuries be specified as:    Unstageable pressure injury to left posterior thigh and stage 2 pressure injuries to perineum and right coccyx (present on admission)    Unstageable pressure injury to left posterior thigh and stage 2 pressure injuries to perineum and right coccyx (not present on admission)    Other (please specify)________________________  Clinically unable to Determine      Rama Mendoza  Clinical Documentation Integrity  [email or web address]      This query and your response, once  completed, will be entered into the legal medical record.

## 2021-06-03 NOTE — PROGRESS NOTES
Enter Query Response Below      Query Response:     Decompensated heart failure with preserved ejection fraction  Electronically signed by Uziel Rodriguez MD, 21, 5:14 PM EDT.  [facility] [place] Hospitalist [facility]           If applicable, please update the problem list.             Patient: Raz Valencia        : 1960  Account: [id number]           Admit Date: 2021        Options to Respond to Query:    1. Access the Encounter     a. From the To-Do Side bar, click Respond With Note.     b. Click New Note     c. Answer query within the yellow box.                d. Update the Problem List if applicable.       By submitting this query, we are merely seeking further clarification of the documentation to accurately reflect all conditions that you are monitoring, evaluating, treating or that extends the hospitalization. Please utilize your independent clinical judgment when addressing the question(s) below.    62 y/o male admitted with neutropenia and pneumonia. Noted to have CHF per cardiology notes and possible CHF per attending. BNP elevated at 7361. EF 60% with left ventricular wall thickness c/w mild concentric hypertrophy. + BLE edema. Trace layering pleural effusions per CT of chest. Pt. received IV Lasix.       After further study, can the Pt's condition be specified as:    Decompensated heart failure with preserved ejection fraction  Compensated heart failure with preserved ejection fraction  No heart failure  Other (please specify)___________________  Clinically unable to Determine      Rama Mendoza  Clinical Documentation Integrity  [email or web address]      This query and your response, once completed, will be entered into the legal medical record.

## 2021-06-09 LAB — REF LAB TEST METHOD: NORMAL

## 2021-06-29 LAB — QT INTERVAL: 423 MS
